# Patient Record
Sex: MALE | Race: WHITE | NOT HISPANIC OR LATINO | Employment: OTHER | URBAN - METROPOLITAN AREA
[De-identification: names, ages, dates, MRNs, and addresses within clinical notes are randomized per-mention and may not be internally consistent; named-entity substitution may affect disease eponyms.]

---

## 2018-08-16 ENCOUNTER — TELEPHONE (OUTPATIENT)
Dept: CARDIAC REHAB | Facility: CLINIC | Age: 83
End: 2018-08-16

## 2018-08-16 ENCOUNTER — HOSPITAL ENCOUNTER (OUTPATIENT)
Dept: CARDIAC REHAB | Facility: CLINIC | Age: 83
End: 2018-08-16
Attending: INTERNAL MEDICINE
Payer: MEDICARE

## 2018-08-16 NOTE — PROGRESS NOTES
"RT met with patient and his spouse. Reviewed patient history, which was sparse and incomplete due to minimal faxed records. By the end of the interview, Care Everywhere access was available (as a call had been placed to [facility] earlier for more records). The Pulmonary Rehab program was described in detail including: expectations, attendance, goals, and orientation. As RT was about to get patient's BP before doing the 6MWT, patient turned to his wife and said \"I don't want this. I'll just keep going as I've been going.\" Staff asked him his concerns and he just didn't feel this would be a good fit for him with his schedule and the distance he'd have to travel to and from. The interview lasted approximately 45 minutes, and the patient has decided against Pulmonary Rehab.  "

## 2021-04-06 ENCOUNTER — HOSPITAL LABORATORY (OUTPATIENT)
Dept: OTHER | Facility: CLINIC | Age: 86
End: 2021-04-06

## 2021-04-07 LAB
GAMMA INTERFERON BACKGROUND BLD IA-ACNC: 0.14 IU/ML
M TB IFN-G CD4+ BCKGRND COR BLD-ACNC: 4.94 IU/ML
M TB TUBERC IFN-G BLD QL: NEGATIVE
MITOGEN IGNF BCKGRD COR BLD-ACNC: 0.01 IU/ML
MITOGEN IGNF BCKGRD COR BLD-ACNC: 0.04 IU/ML

## 2021-04-09 ENCOUNTER — DOCUMENTATION ONLY (OUTPATIENT)
Dept: OTHER | Facility: CLINIC | Age: 86
End: 2021-04-09

## 2021-04-20 PROBLEM — I25.5 ISCHEMIC CARDIOMYOPATHY: Status: ACTIVE | Noted: 2017-08-31

## 2021-04-20 PROBLEM — E78.5 HYPERLIPIDEMIA: Status: ACTIVE | Noted: 2021-04-20

## 2021-04-20 PROBLEM — I50.22 CHRONIC SYSTOLIC HEART FAILURE (H): Status: ACTIVE | Noted: 2017-10-05

## 2021-04-20 PROBLEM — M15.9 GENERALIZED OSTEOARTHROSIS: Status: ACTIVE | Noted: 2021-04-20

## 2021-04-20 PROBLEM — I10 ESSENTIAL HYPERTENSION: Status: ACTIVE | Noted: 2017-10-05

## 2021-04-20 PROBLEM — I51.3 APICAL MURAL THROMBUS: Status: ACTIVE | Noted: 2017-08-31

## 2021-04-20 PROBLEM — K63.5 COLON POLYPS: Status: ACTIVE | Noted: 2018-11-28

## 2021-04-20 PROBLEM — R31.0 GROSS HEMATURIA: Status: ACTIVE | Noted: 2019-09-09

## 2021-04-20 PROBLEM — M75.00 ADHESIVE CAPSULITIS OF SHOULDER: Status: ACTIVE | Noted: 2021-04-20

## 2021-04-20 PROBLEM — I47.10 SVT (SUPRAVENTRICULAR TACHYCARDIA) (H): Status: ACTIVE | Noted: 2017-08-31

## 2021-04-20 PROBLEM — M17.11 PRIMARY OSTEOARTHRITIS OF RIGHT KNEE: Status: ACTIVE | Noted: 2020-02-28

## 2021-04-20 PROBLEM — R97.20 ELEVATED PROSTATE SPECIFIC ANTIGEN (PSA): Status: ACTIVE | Noted: 2021-04-20

## 2021-04-20 PROBLEM — I47.20 VT (VENTRICULAR TACHYCARDIA) (H): Status: ACTIVE | Noted: 2017-08-31

## 2021-04-20 PROBLEM — I25.10 CORONARY ATHEROSCLEROSIS: Status: ACTIVE | Noted: 2021-04-20

## 2021-04-20 PROBLEM — K64.8 HEMORRHOIDS, INTERNAL: Status: ACTIVE | Noted: 2018-11-28

## 2021-04-20 PROBLEM — Z78.9 ACTIVE ADVANCE DIRECTIVE: Status: ACTIVE | Noted: 2018-05-04

## 2021-04-20 PROBLEM — D64.9 ANEMIA: Status: ACTIVE | Noted: 2018-11-28

## 2021-04-20 PROBLEM — M67.919 DISORDER OF BURSAE AND TENDONS IN SHOULDER REGION: Status: ACTIVE | Noted: 2021-04-20

## 2021-04-20 PROBLEM — U07.1 2019 NOVEL CORONAVIRUS DISEASE (COVID-19): Status: ACTIVE | Noted: 2021-04-05

## 2021-04-20 PROBLEM — M71.9 DISORDER OF BURSAE AND TENDONS IN SHOULDER REGION: Status: ACTIVE | Noted: 2021-04-20

## 2021-04-20 PROBLEM — Z95.1 S/P CABG (CORONARY ARTERY BYPASS GRAFT): Status: ACTIVE | Noted: 2017-08-31

## 2021-04-20 NOTE — PROGRESS NOTES
[facility] GERIATRIC SERVICES  Primary Care Provider & Clinic: TALAT  ELVIRA, [address] [facility] [facility] / [place] [address]  Chief Complaint   Patient presents with     Hospital F/U     [facility] 4/12/2021 - 4/20/2021     [place] Medical Record Number: [id number]  Place of Service Where Encounter Took Place: THE ESTATES AT [facility] ([facility]) [[id number]]    Rubén Lane is a 88 year old (1/11/1933), admitted to the above facility from  [facility] . Hospital stay 4/12/21 through 4/20/21. Admitted to this facility for rehab, medical management and nursing care.    HPI:      H&P reviewed.   Patient tested + for COVID 4/1/21. Had sx's one week prior.   He was recovering at [facility] and was sent to ER for hypoxia. On high flow o2 for 3 days. Started on prednisone and tocilizumab. Unresponsive episode and tele showed VTACH. Cards started sololol despite prolonged QTC (525 sec). ICD was adjusted to lower shock threshold. Patient noted to have hematuria, urology consulted and ok to continue xarelto due to stable hgb.     No RN concerns today.   Met with patient in his room. He is lying in bed. Affect is flat. States his wife is also ill with COVID, but is recovering at home. He denies CP or dyspnea. No bowel or bladder concerns.     Code Status/Advanced Directives Discussion: DNR only  Patient's Living Condition: lives with spouse in a house  ALLERGIES: Patient has no known allergies.  PAST MEDICAL HISTORY:  has no past medical history on file.  PAST SURGICAL HISTORY:  has no past surgical history on file.  FAMILY HISTORY: family history is not on file.    Post Discharge Medication Reconciliation Status: discharge medications reconciled, continue medications without change  Current Outpatient Medications   Medication Sig Dispense Refill     acetaminophen (TYLENOL) 500 MG tablet Take 1,000 mg by mouth 3 times daily       albuterol (PROAIR HFA/PROVENTIL HFA/VENTOLIN HFA) 108 (90 Base) MCG/ACT inhaler Inhale 2  "puffs into the lungs 4 times daily       apixaban ANTICOAGULANT (ELIQUIS) 5 MG tablet Take 5 mg by mouth 2 times daily       aspirin (ASPIRIN ADULT LOW DOSE) 81 MG EC tablet Take 81 mg by mouth daily       atorvastatin (LIPITOR) 80 MG tablet Take 80 mg by mouth every evening       FLUoxetine (PROZAC) 20 MG capsule Take 40 mg by mouth daily       metoprolol tartrate (LOPRESSOR) 25 MG tablet Take 25 mg by mouth 2 times daily       nitroGLYcerin (NITROSTAT) 0.4 MG sublingual tablet Place 0.4 mg under the tongue every 5 minutes as needed for chest pain For chest pain place 1 tablet under the tongue every 5 minutes for 3 doses. If symptoms persist 5 minutes after 1st dose call 911.       sotalol (BETAPACE) 80 MG tablet Take 80 mg by mouth daily       ROS:  10 point ROS of systems including Constitutional, Eyes, Respiratory, Cardiovascular, Gastroenterology, Genitourinary, Integumentary, Musculoskeletal, Psychiatric were all negative except for pertinent positives noted in my HPI.    Vitals:  /68   Pulse 72   Temp 97.8  F (36.6  C)   Resp 18   Ht 1.803 m (5' 11\")   Wt 75.8 kg (167 lb 3.2 oz)   SpO2 92%   BMI 23.32 kg/m    Exam:  GENERAL APPEARANCE:  Alert, in no distress  RESP:  respiratory effort and palpation of chest normal, auscultation of lungs diminished/clear , no respiratory distress  CV:  Palpation and auscultation of heart done , rate and rhythm irreg, distant heart tones peripheral edema  ABDOMEN:  normal bowel sounds, soft, nontender, no hepatosplenomegaly or other masses  M/S:   Gait and station steady, Digits and nails no concerns  SKIN:  Inspection and Palpation of skin and subcutaneous tissue pale/dry  PSYCH:  insight and judgement, memory intact , affect flat    Lab/Diagnostic data:  Recent labs in [facility] reviewed by me today.     ASSESSMENT/PLAN:  Pneumonia due to 2019 novel coronavirus  - + date 4/1/21. Treated with prednisone and tocilizumab and high flow o2  - on 2L supplemental oxygen, no " current resp distress  - labs ordered. OK to wean o2 to keep sats >90%    Biventricular CHF (congestive heart failure) (H)  - EF 30%. ICD with pacemaker  - cards started sotolol due to episode of Vtach (despite prolonged QTC)   - patient verified DNR/DNI status      Coronary artery disease involving native coronary artery of native heart without angina pectoris  ICD (implantable cardioverter-defibrillator) in place  - hx of CABG  - on statin, Asa, xarelto and BB. ACE stopped due to hypotension    PAF (paroxysmal atrial fibrillation) (H)  - on asa and xarelto  - rate controlled on metoprolol    Benign essential microscopic hematuria  - urology consult. Ok to continue xarelto and asa  - f/u with urology in 6-8 weeks  - plan to follow labs    Current mild episode of major depressive disorder, unspecified whether recurrent (H)  - on prozac, monitor     .     Electronically signed by:  HILDA Key CNP

## 2021-04-21 ENCOUNTER — NURSING HOME VISIT (OUTPATIENT)
Dept: GERIATRICS | Facility: CLINIC | Age: 86
End: 2021-04-21
Payer: COMMERCIAL

## 2021-04-21 VITALS
WEIGHT: 167.2 LBS | DIASTOLIC BLOOD PRESSURE: 68 MMHG | OXYGEN SATURATION: 92 % | RESPIRATION RATE: 18 BRPM | HEIGHT: 71 IN | TEMPERATURE: 97.8 F | SYSTOLIC BLOOD PRESSURE: 105 MMHG | BODY MASS INDEX: 23.41 KG/M2 | HEART RATE: 72 BPM

## 2021-04-21 DIAGNOSIS — I48.0 PAF (PAROXYSMAL ATRIAL FIBRILLATION) (H): ICD-10-CM

## 2021-04-21 DIAGNOSIS — I25.10 CORONARY ARTERY DISEASE INVOLVING NATIVE CORONARY ARTERY OF NATIVE HEART WITHOUT ANGINA PECTORIS: ICD-10-CM

## 2021-04-21 DIAGNOSIS — J12.82 PNEUMONIA DUE TO 2019 NOVEL CORONAVIRUS: Primary | ICD-10-CM

## 2021-04-21 DIAGNOSIS — F32.0 CURRENT MILD EPISODE OF MAJOR DEPRESSIVE DISORDER, UNSPECIFIED WHETHER RECURRENT (H): ICD-10-CM

## 2021-04-21 DIAGNOSIS — I50.82 BIVENTRICULAR CHF (CONGESTIVE HEART FAILURE) (H): ICD-10-CM

## 2021-04-21 DIAGNOSIS — R31.1 BENIGN ESSENTIAL MICROSCOPIC HEMATURIA: ICD-10-CM

## 2021-04-21 DIAGNOSIS — U07.1 PNEUMONIA DUE TO 2019 NOVEL CORONAVIRUS: Primary | ICD-10-CM

## 2021-04-21 DIAGNOSIS — Z95.810 ICD (IMPLANTABLE CARDIOVERTER-DEFIBRILLATOR) IN PLACE: ICD-10-CM

## 2021-04-21 PROBLEM — R41.82 ALTERED MENTAL STATUS, UNSPECIFIED: Status: ACTIVE | Noted: 2021-04-05

## 2021-04-21 PROBLEM — E78.5 HYPERLIPIDEMIA, UNSPECIFIED: Status: ACTIVE | Noted: 2021-04-05

## 2021-04-21 PROBLEM — M62.81 MUSCLE WEAKNESS (GENERALIZED): Status: ACTIVE | Noted: 2021-04-05

## 2021-04-21 PROBLEM — R09.02 HYPOXEMIA: Status: ACTIVE | Noted: 2021-04-05

## 2021-04-21 PROBLEM — J12.81 PNEUMONIA DUE TO SARS-ASSOCIATED CORONAVIRUS: Status: ACTIVE | Noted: 2021-04-05

## 2021-04-21 PROBLEM — F33.9 MAJOR DEPRESSIVE DISORDER, RECURRENT, UNSPECIFIED (H): Status: ACTIVE | Noted: 2021-04-05

## 2021-04-21 PROCEDURE — 99309 SBSQ NF CARE MODERATE MDM 30: CPT | Performed by: NURSE PRACTITIONER

## 2021-04-21 RX ORDER — NITROGLYCERIN 0.4 MG/1
0.4 TABLET SUBLINGUAL EVERY 5 MIN PRN
COMMUNITY
Start: 2021-04-21

## 2021-04-21 RX ORDER — METOPROLOL TARTRATE 25 MG/1
25 TABLET, FILM COATED ORAL 2 TIMES DAILY
COMMUNITY
Start: 2021-04-21 | End: 2023-09-07

## 2021-04-21 RX ORDER — ACETAMINOPHEN 500 MG
1000 TABLET ORAL 3 TIMES DAILY
COMMUNITY
Start: 2021-04-21

## 2021-04-21 RX ORDER — ATORVASTATIN CALCIUM 80 MG/1
80 TABLET, FILM COATED ORAL EVERY EVENING
COMMUNITY
Start: 2021-04-21 | End: 2023-09-07 | Stop reason: ALTCHOICE

## 2021-04-21 RX ORDER — ALBUTEROL SULFATE 90 UG/1
2 AEROSOL, METERED RESPIRATORY (INHALATION) 4 TIMES DAILY
COMMUNITY
Start: 2021-04-21

## 2021-04-21 RX ORDER — SOTALOL HYDROCHLORIDE 80 MG/1
80 TABLET ORAL DAILY
COMMUNITY
Start: 2021-04-21

## 2021-04-21 ASSESSMENT — MIFFLIN-ST. JEOR: SCORE: 1450.54

## 2021-04-21 NOTE — LETTER
4/21/2021        RE: Rubén Lane  [address]  [place] [address]        [facility] GERIATRIC SERVICES  Primary Care Provider & Clinic: TALAT FELIX, [address] [facility] / [place] [address]  Chief Complaint   Patient presents with     Hospital F/U     [facility] 4/12/2021 - 4/20/2021     [place] Medical Record Number: [id number]  Place of Service Where Encounter Took Place: THE ESTATES AT [facility] ([facility]) [[id number]]    Rubén Lane is a 88 year old (1/11/1933), admitted to the above facility from  [facility] . Hospital stay 4/12/21 through 4/20/21. Admitted to this facility for rehab, medical management and nursing care.    HPI:      H&P reviewed.   Patient tested + for COVID 4/1/21. Had sx's one week prior.   He was recovering at [facility] and was sent to ER for hypoxia. On high flow o2 for 3 days. Started on prednisone and tocilizumab. Unresponsive episode and tele showed VTACH. Cards started sololol despite prolonged QTC (525 sec). ICD was adjusted to lower shock threshold. Patient noted to have hematuria, urology consulted and ok to continue xarelto due to stable hgb.     No RN concerns today.   Met with patient in his room. He is lying in bed. Affect is flat. States his wife is also ill with COVID, but is recovering at home. He denies CP or dyspnea. No bowel or bladder concerns.     Code Status/Advanced Directives Discussion: DNR only  Patient's Living Condition: lives with spouse in a house  ALLERGIES: Patient has no known allergies.  PAST MEDICAL HISTORY:  has no past medical history on file.  PAST SURGICAL HISTORY:  has no past surgical history on file.  FAMILY HISTORY: family history is not on file.    Post Discharge Medication Reconciliation Status: discharge medications reconciled, continue medications without change  Current Outpatient Medications   Medication Sig Dispense Refill     acetaminophen (TYLENOL) 500 MG tablet Take 1,000 mg by mouth 3 times daily        "albuterol (PROAIR HFA/PROVENTIL HFA/VENTOLIN HFA) 108 (90 Base) MCG/ACT inhaler Inhale 2 puffs into the lungs 4 times daily       apixaban ANTICOAGULANT (ELIQUIS) 5 MG tablet Take 5 mg by mouth 2 times daily       aspirin (ASPIRIN ADULT LOW DOSE) 81 MG EC tablet Take 81 mg by mouth daily       atorvastatin (LIPITOR) 80 MG tablet Take 80 mg by mouth every evening       FLUoxetine (PROZAC) 20 MG capsule Take 40 mg by mouth daily       metoprolol tartrate (LOPRESSOR) 25 MG tablet Take 25 mg by mouth 2 times daily       nitroGLYcerin (NITROSTAT) 0.4 MG sublingual tablet Place 0.4 mg under the tongue every 5 minutes as needed for chest pain For chest pain place 1 tablet under the tongue every 5 minutes for 3 doses. If symptoms persist 5 minutes after 1st dose call 911.       sotalol (BETAPACE) 80 MG tablet Take 80 mg by mouth daily       ROS:  10 point ROS of systems including Constitutional, Eyes, Respiratory, Cardiovascular, Gastroenterology, Genitourinary, Integumentary, Musculoskeletal, Psychiatric were all negative except for pertinent positives noted in my HPI.    Vitals:  /68   Pulse 72   Temp 97.8  F (36.6  C)   Resp 18   Ht 1.803 m (5' 11\")   Wt 75.8 kg (167 lb 3.2 oz)   SpO2 92%   BMI 23.32 kg/m    Exam:  GENERAL APPEARANCE:  Alert, in no distress  RESP:  respiratory effort and palpation of chest normal, auscultation of lungs diminished/clear , no respiratory distress  CV:  Palpation and auscultation of heart done , rate and rhythm irreg, distant heart tones peripheral edema  ABDOMEN:  normal bowel sounds, soft, nontender, no hepatosplenomegaly or other masses  M/S:   Gait and station steady, Digits and nails no concerns  SKIN:  Inspection and Palpation of skin and subcutaneous tissue pale/dry  PSYCH:  insight and judgement, memory intact , affect flat    Lab/Diagnostic data:  Recent labs in [facility] reviewed by me today.     ASSESSMENT/PLAN:  Pneumonia due to 2019 novel coronavirus  - + date 4/1/21. " Treated with prednisone and tocilizumab and high flow o2  - on 2L supplemental oxygen, no current resp distress  - labs ordered. OK to wean o2 to keep sats >90%    Biventricular CHF (congestive heart failure) (H)  - EF 30%. ICD with pacemaker  - cards started sotolol due to episode of Vtach (despite prolonged QTC)   - patient verified DNR/DNI status      Coronary artery disease involving native coronary artery of native heart without angina pectoris  ICD (implantable cardioverter-defibrillator) in place  - hx of CABG  - on statin, Asa, xarelto and BB. ACE stopped due to hypotension    PAF (paroxysmal atrial fibrillation) (H)  - on asa and xarelto  - rate controlled on metoprolol    Benign essential microscopic hematuria  - urology consult. Ok to continue xarelto and asa  - f/u with urology in 6-8 weeks  - plan to follow labs    Current mild episode of major depressive disorder, unspecified whether recurrent (H)  - on prozac, monitor     .     Electronically signed by:  HILDA Key CNP           Sincerely,        HILDA Key CNP

## 2021-04-26 ENCOUNTER — NURSING HOME VISIT (OUTPATIENT)
Dept: GERIATRICS | Facility: CLINIC | Age: 86
End: 2021-04-26
Payer: COMMERCIAL

## 2021-04-26 VITALS
BODY MASS INDEX: 27.61 KG/M2 | RESPIRATION RATE: 18 BRPM | DIASTOLIC BLOOD PRESSURE: 67 MMHG | WEIGHT: 197.2 LBS | SYSTOLIC BLOOD PRESSURE: 103 MMHG | HEART RATE: 71 BPM | OXYGEN SATURATION: 96 % | HEIGHT: 71 IN | TEMPERATURE: 97.4 F

## 2021-04-26 DIAGNOSIS — U07.1 PNEUMONIA DUE TO 2019 NOVEL CORONAVIRUS: Primary | ICD-10-CM

## 2021-04-26 DIAGNOSIS — J12.82 PNEUMONIA DUE TO 2019 NOVEL CORONAVIRUS: Primary | ICD-10-CM

## 2021-04-26 DIAGNOSIS — F32.0 CURRENT MILD EPISODE OF MAJOR DEPRESSIVE DISORDER, UNSPECIFIED WHETHER RECURRENT (H): ICD-10-CM

## 2021-04-26 PROCEDURE — 99309 SBSQ NF CARE MODERATE MDM 30: CPT | Performed by: NURSE PRACTITIONER

## 2021-04-26 ASSESSMENT — MIFFLIN-ST. JEOR: SCORE: 1586.62

## 2021-04-26 NOTE — LETTER
"    4/26/2021        RE: Rubén Lane  [address] [address]        [facility] GERIATRIC SERVICES    Chief Complaint   Patient presents with     RECHECK     HPI:    Rubén Lane is a 88 year old  (1/11/1933), who is being seen today for an episodic care visit at: [facility] ([facility]) [[id number]]    Seeing patient for a f/u.   Staff note patient with depression and crying spells last week.   Patient requesting to go home this week. Patient is clear to return home by therapy.   Met with patient and his wife in his room.   He is feeling well, no breathing concerns, no CP.       PMH/PSH reviewed in [facility] today.  REVIEW OF SYSTEMS:  4 point ROS including Respiratory, CV, GI and , other than that noted in the HPI,  is negative    EXAM:  /67   Pulse 71   Temp 97.4  F (36.3  C)   Resp 18   Ht 1.803 m (5' 11\")   Wt 89.4 kg (197 lb 3.2 oz)   SpO2 96%   BMI 27.50 kg/m    GEN: alert, no distress  RESP: lungs clear, using supplemental oxygen  SKIN: pale/dry  ABD: soft, non tender    Labs done in SNF are in [facility]. Please refer to them using [facility]/Care Everywhere.    Assessment/Plan:     Pneumonia due to 2019 novel coronavirus  Current mild episode of major depressive disorder, unspecified whether recurrent (H)   - Patient planning to return home in 2 days with home care  Will attempt to wean patient off oxygen.     Orders:    Wean oxygen to keep sats >90%    Electronically signed by:  HILDA Key CNP          Sincerely,        HILDA Key CNP    "

## 2021-04-26 NOTE — PROGRESS NOTES
"[facility] GERIATRIC SERVICES    Chief Complaint   Patient presents with     RECHECK     HPI:    Rubén Lane is a 88 year old  (1/11/1933), who is being seen today for an episodic care visit at: [facility] ([facility]) [[id number]]    Seeing patient for a f/u.   Staff note patient with depression and crying spells last week.   Patient requesting to go home this week. Patient is clear to return home by therapy.   Met with patient and his wife in his room.   He is feeling well, no breathing concerns, no CP.       PMH/PSH reviewed in EPIC today.  REVIEW OF SYSTEMS:  4 point ROS including Respiratory, CV, GI and , other than that noted in the HPI,  is negative    EXAM:  /67   Pulse 71   Temp 97.4  F (36.3  C)   Resp 18   Ht 1.803 m (5' 11\")   Wt 89.4 kg (197 lb 3.2 oz)   SpO2 96%   BMI 27.50 kg/m    GEN: alert, no distress  RESP: lungs clear, using supplemental oxygen  SKIN: pale/dry  ABD: soft, non tender    Labs done in SNF are in [facility]. Please refer to them using [facility]/Care Everywhere.    Assessment/Plan:     Pneumonia due to 2019 novel coronavirus  Current mild episode of major depressive disorder, unspecified whether recurrent (H)   - Patient planning to return home in 2 days with home care  Will attempt to wean patient off oxygen.     Orders:    Wean oxygen to keep sats >90%    Electronically signed by:  HILDA Key CNP    "

## 2021-04-27 NOTE — PROGRESS NOTES
[facility] GERIATRIC SERVICES DISCHARGE SUMMARY  Patient Name: Rubén Lane  YOB: 1933  [place] Medical Record Number: [id number]  Place of Service Where Encounter Took Place: [facility] AT [facility] ([facility]) [[id number]]    Primary Care Provider & Clinic Responsible After Transfer: TALAT FELIX, [address] [facility] / [place] [address]; Non-[facility] Provider     Transferring providers: HILDA Cordova CNP; Mary Garcia MD  Recent Hospitalization/ED: Hospital  [facility]  stay 4/12/21 to 4/20/21.  Date of SNF Admission: April 20, 2021  Date of SNF (anticipated) Discharge: April 28, 2021  Discharged to: previous independent home  Cognitive Scores: SLUMS: 17/30 and ACL: 5.0/5.8  Physical Function: Ambulating 150 ft with rolling walker  DME: Home Oxygen    Code Status/Advanced Directives Discussion: DNR  Allergies: Patient has no known allergies.    DISCHARGE DIAGNOSIS/NURSING FACILITY COURSE:     Pneumonia due to 2019 novel coronavirus  - + date 4/1/21. Treated with prednisone and tocilizumab and high flow o2  - on 2L supplemental oxygen, no current resp distress  - attempted to discontinue oxygen, sats' down to 84%. Will order oxygen for home use. Recommend patient purchase an oximeter to monitor sats. RN educated on oximeter use     Biventricular CHF (congestive heart failure) (H)  - EF 30%. ICD with pacemaker  - cards started sotolol due to episode of Vtach (despite prolonged QTC)   - patient verified DNR/DNI status        Coronary artery disease involving native coronary artery of native heart without angina pectoris  ICD (implantable cardioverter-defibrillator) in place  - hx of CABG  - on statin, Asa, xarelto and BB. ACE stopped due to hypotension  - vitals stable at U     PAF (paroxysmal atrial fibrillation) (H)  - on asa and xarelto  - rate controlled on metoprolol     Benign essential microscopic hematuria  - urology consult during hospitalization. Ok to continue  "xarelto and asa  - f/u with urology in 6-8 weeks     Current mild episode of major depressive disorder, unspecified whether recurrent (H)  - on prozac, mood down at TCU, better now that discharging    Past Medical History:  has no past medical history on file.    Discharge Medications:  Current Outpatient Medications   Medication Sig Dispense Refill     acetaminophen (TYLENOL) 500 MG tablet Take 1,000 mg by mouth 3 times daily       albuterol (PROAIR HFA/PROVENTIL HFA/VENTOLIN HFA) 108 (90 Base) MCG/ACT inhaler Inhale 2 puffs into the lungs 4 times daily       apixaban ANTICOAGULANT (ELIQUIS) 5 MG tablet Take 5 mg by mouth 2 times daily       aspirin (ASPIRIN ADULT LOW DOSE) 81 MG EC tablet Take 81 mg by mouth daily       atorvastatin (LIPITOR) 80 MG tablet Take 80 mg by mouth every evening       FLUoxetine (PROZAC) 20 MG capsule Take 40 mg by mouth daily       metoprolol tartrate (LOPRESSOR) 25 MG tablet Take 25 mg by mouth 2 times daily       nitroGLYcerin (NITROSTAT) 0.4 MG sublingual tablet Place 0.4 mg under the tongue every 5 minutes as needed for chest pain For chest pain place 1 tablet under the tongue every 5 minutes for 3 doses. If symptoms persist 5 minutes after 1st dose call 911.       sotalol (BETAPACE) 80 MG tablet Take 80 mg by mouth daily       Medication Changes/Rationale:     none    Controlled medications sent with patient:   not applicable/none     ROS:   4 point ROS including Respiratory, CV, GI and , other than that noted in the HPI,  is negative    Physical Exam:   Vitals: /78   Pulse 69   Temp 97.1  F (36.2  C)   Resp 16   Ht 1.803 m (5' 11\")   Wt 75.8 kg (167 lb 3.2 oz)   SpO2 95%   BMI 23.32 kg/m    BMI= Body mass index is 23.32 kg/m .  GENERAL APPEARANCE:  Alert, in no distress  RESP:  lungs clear to auscultation , no respiratory distress  CV:  HR irreg, systolic murmur, no edema  ABDOMEN:  normal bowel sounds, soft, nontender, no hepatosplenomegaly or other masses  M/S:   " Gait and station normal  PSYCH:  oriented X 3, normal insight, judgement and memory     SNF labs: Labs done in SNF are in [employer] EPIC. Please refer to them using EPIC/Care Everywhere. and no labs drawn at TCU      DISCHARGE PLAN:    Follow up labs: No labs orders/due    Medical Follow Up:     Follow up/re-establish care with primary care provider within 1 week of discharge    Follow up with specialist (urologist) on 6/17/21 at 1:45 pm    Address: [address], [place], [place]     Phone number: [phone]    [facility] referral needed and placed by this provider: No  Current [employer] scheduled appointments: None      Discharge Services: Home Care: Occupational Therapy, Physical Therapy, and Registered Nurse[facility]    Discharge Instructions Verbalized to Patient at Discharge:     None    TOTAL DISCHARGE TIME:   Greater than 30 minutes  Electronically signed by:  HILDA Key CNP     I certify that this patient, Rubén Lane has been under my care (or a nurse practitioner or physican's assistant working with me). This is the face-to-face encounter for oxygen medical necessity.      Rubén Lane is now in a chronic stable state and continues to require supplemental oxygen. Patient has continued oxygen desaturation due to hypoxia secondary to viral pna due to COVID.    Alternative treatment(s) tried or considered and deemed clinically infective for treatment of pneumonia secondary to covid include pulmonary toileting.  If portability is ordered, is the patient mobile within the home? yes    **Patients who qualify for home O2 coverage under the CMS guidelines require ABG tests or O2 sat readings obtained closest to, but no earlier than 2 days prior to the discharge, as evidence of the need for home oxygen therapy. Testing must be performed while patient is in the chronic stable state. O2 sats 84% with walking.     Documentation of Face to Face and Certification for Home Health Services    I certify that  patient: Rubén Lane is under my care and that I, or a nurse practitioner or physician's assistant working with me, had a face-to-face encounter that meets the physician face-to-face encounter requirements with this patient on: 4/28/2021.    This encounter with the patient was in whole, or in part, for the following medical condition, which is the primary reason for home health care: hypoxia secondary to viral pneumonia due to COVID.    I certify that, based on my findings, the following services are medically necessary home health services: Nursing, Occupational Therapy and Physical Therapy.    My clinical findings support the need for the above services because: Nurse is needed: monitor oxygen due to hypoxia and new use of home oxygen.    Further, I certify that my clinical findings support that this patient is homebound (i.e. absences from home require considerable and taxing effort and are for medical reasons or Orthodoxy services or infrequently or of short duration when for other reasons) because: Leaving home is medically contraindicated for the following reason(s): Dyspnea on exertion that makes it so they cannot leave their home for needed services without clinical deterioration...    Based on the above findings. I certify that this patient is confined to the home and needs intermittent skilled nursing care, physical therapy and/or speech therapy.  The patient is under my care, and I have initiated the establishment of the plan of care.  This patient will be followed by a physician who will periodically review the plan of care.  Physician/Provider to provide follow up care: Tata Maguire    Attending hospital physician (the Medicare certified [facility] provider): HILDA Key CNP  Physician Signature: See electronic signature associated with these discharge orders.  Date: 4/28/2021

## 2021-04-28 ENCOUNTER — DISCHARGE SUMMARY NURSING HOME (OUTPATIENT)
Dept: GERIATRICS | Facility: CLINIC | Age: 86
End: 2021-04-28
Payer: COMMERCIAL

## 2021-04-28 VITALS
BODY MASS INDEX: 23.41 KG/M2 | HEIGHT: 71 IN | OXYGEN SATURATION: 95 % | TEMPERATURE: 97.1 F | SYSTOLIC BLOOD PRESSURE: 131 MMHG | HEART RATE: 69 BPM | DIASTOLIC BLOOD PRESSURE: 78 MMHG | RESPIRATION RATE: 16 BRPM | WEIGHT: 167.2 LBS

## 2021-04-28 DIAGNOSIS — I25.10 CORONARY ARTERY DISEASE INVOLVING NATIVE CORONARY ARTERY OF NATIVE HEART WITHOUT ANGINA PECTORIS: ICD-10-CM

## 2021-04-28 DIAGNOSIS — Z95.810 ICD (IMPLANTABLE CARDIOVERTER-DEFIBRILLATOR) IN PLACE: ICD-10-CM

## 2021-04-28 DIAGNOSIS — J12.82 PNEUMONIA DUE TO 2019 NOVEL CORONAVIRUS: Primary | ICD-10-CM

## 2021-04-28 DIAGNOSIS — I48.0 PAF (PAROXYSMAL ATRIAL FIBRILLATION) (H): ICD-10-CM

## 2021-04-28 DIAGNOSIS — I50.82 BIVENTRICULAR CHF (CONGESTIVE HEART FAILURE) (H): ICD-10-CM

## 2021-04-28 DIAGNOSIS — U07.1 PNEUMONIA DUE TO 2019 NOVEL CORONAVIRUS: Primary | ICD-10-CM

## 2021-04-28 DIAGNOSIS — R09.02 HYPOXIA: ICD-10-CM

## 2021-04-28 DIAGNOSIS — F32.0 CURRENT MILD EPISODE OF MAJOR DEPRESSIVE DISORDER, UNSPECIFIED WHETHER RECURRENT (H): ICD-10-CM

## 2021-04-28 PROCEDURE — 99316 NF DSCHRG MGMT 30 MIN+: CPT | Performed by: NURSE PRACTITIONER

## 2021-04-28 ASSESSMENT — MIFFLIN-ST. JEOR: SCORE: 1450.54

## 2021-04-28 NOTE — LETTER
4/28/2021        RE: Rubén Lane  [address] [facility] [place] [address]        [facility] GERIATRIC SERVICES DISCHARGE SUMMARY  Patient Name: Rubén Lane  YOB: 1933  [place] Medical Record Number: [id number]  [facility] [[id number]]    Primary Care Provider & Clinic Responsible After Transfer: TALAT FELIX, [address] / [place] [address]; Non-G Provider     Transferring providers: HILDA Cordova CNP; Mary Garcia MD  Recent Hospitalization/ED: Hospital  [facility]  stay 4/12/21 to 4/20/21.  Date of SNF Admission: April 20, 2021  Date of SNF (anticipated) Discharge: April 28, 2021  Discharged to: previous independent home  Cognitive Scores: SLUMS: 17/30 and ACL: 5.0/5.8  Physical Function: Ambulating 150 ft with rolling walker  DME: Home Oxygen    Code Status/Advanced Directives Discussion: DNR  Allergies: Patient has no known allergies.    DISCHARGE DIAGNOSIS/NURSING FACILITY COURSE:     Pneumonia due to 2019 novel coronavirus  - + date 4/1/21. Treated with prednisone and tocilizumab and high flow o2  - on 2L supplemental oxygen, no current resp distress  - attempted to discontinue oxygen, sats' down to 84%. Will order oxygen for home use. Recommend patient purchase an oximeter to monitor sats. RN educated on oximeter use     Biventricular CHF (congestive heart failure) (H)  - EF 30%. ICD with pacemaker  - cards started sotolol due to episode of Vtach (despite prolonged QTC)   - patient verified DNR/DNI status        Coronary artery disease involving native coronary artery of native heart without angina pectoris  ICD (implantable cardioverter-defibrillator) in place  - hx of CABG  - on statin, Asa, xarelto and BB. ACE stopped due to hypotension  - vitals stable at [facility]     PAF (paroxysmal atrial fibrillation) (H)  - on asa and xarelto  - rate controlled on metoprolol     Benign essential  "microscopic hematuria  - urology consult during hospitalization. Ok to continue xarelto and asa  - f/u with urology in 6-8 weeks     Current mild episode of major depressive disorder, unspecified whether recurrent (H)  - on prozac, mood down at TCU, better now that discharging    Past Medical History:  has no past medical history on file.    Discharge Medications:  Current Outpatient Medications   Medication Sig Dispense Refill     acetaminophen (TYLENOL) 500 MG tablet Take 1,000 mg by mouth 3 times daily       albuterol (PROAIR HFA/PROVENTIL HFA/VENTOLIN HFA) 108 (90 Base) MCG/ACT inhaler Inhale 2 puffs into the lungs 4 times daily       apixaban ANTICOAGULANT (ELIQUIS) 5 MG tablet Take 5 mg by mouth 2 times daily       aspirin (ASPIRIN ADULT LOW DOSE) 81 MG EC tablet Take 81 mg by mouth daily       atorvastatin (LIPITOR) 80 MG tablet Take 80 mg by mouth every evening       FLUoxetine (PROZAC) 20 MG capsule Take 40 mg by mouth daily       metoprolol tartrate (LOPRESSOR) 25 MG tablet Take 25 mg by mouth 2 times daily       nitroGLYcerin (NITROSTAT) 0.4 MG sublingual tablet Place 0.4 mg under the tongue every 5 minutes as needed for chest pain For chest pain place 1 tablet under the tongue every 5 minutes for 3 doses. If symptoms persist 5 minutes after 1st dose call 911.       sotalol (BETAPACE) 80 MG tablet Take 80 mg by mouth daily       Medication Changes/Rationale:     none    Controlled medications sent with patient:   not applicable/none     ROS:   4 point ROS including Respiratory, CV, GI and , other than that noted in the HPI,  is negative    Physical Exam:   Vitals: /78   Pulse 69   Temp 97.1  F (36.2  C)   Resp 16   Ht 1.803 m (5' 11\")   Wt 75.8 kg (167 lb 3.2 oz)   SpO2 95%   BMI 23.32 kg/m    BMI= Body mass index is 23.32 kg/m .  GENERAL APPEARANCE:  Alert, in no distress  RESP:  lungs clear to auscultation , no respiratory distress  CV:  HR irreg, systolic murmur, no edema  ABDOMEN:  " normal bowel sounds, soft, nontender, no hepatosplenomegaly or other masses  M/S:   Gait and station normal  PSYCH:  oriented X 3, normal insight, judgement and memory     SNF labs: Labs done in SNF are in [facility] EPIC. Please refer to them using EPIC/Care Everywhere. and no labs drawn at TCU      DISCHARGE PLAN:    Follow up labs: No labs orders/due    Medical Follow Up:     Follow up/re-establish care with primary care provider within 1 week of discharge    Follow up with specialist (urologist) on 6/17/21 at 1:45 pm    Address: [facility] [place], [place]     Phone number: [phone]    [facility] referral needed and placed by this provider: No  Current [facility] scheduled appointments: None      Discharge Services: Home Care: Occupational Therapy, Physical Therapy, and Registered Nurse. [facility]    Discharge Instructions Verbalized to Patient at Discharge:     None    TOTAL DISCHARGE TIME:   Greater than 30 minutes  Electronically signed by:  HILDA Key CNP     I certify that this patient, Rubén Lane has been under my care (or a nurse practitioner or physican's assistant working with me). This is the face-to-face encounter for oxygen medical necessity.      Rubén Lane is now in a chronic stable state and continues to require supplemental oxygen. Patient has continued oxygen desaturation due to hypoxia secondary to viral pna due to COVID.    Alternative treatment(s) tried or considered and deemed clinically infective for treatment of pneumonia secondary to covid include pulmonary toileting.  If portability is ordered, is the patient mobile within the home? yes    **Patients who qualify for home O2 coverage under the CMS guidelines require ABG tests or O2 sat readings obtained closest to, but no earlier than 2 days prior to the discharge, as evidence of the need for home oxygen therapy. Testing must be performed while patient is in the chronic stable state. O2 sats 84% with walking.      Documentation of Face to Face and Certification for Home Health Services    I certify that patient: Rubén Lane is under my care and that I, or a nurse practitioner or physician's assistant working with me, had a face-to-face encounter that meets the physician face-to-face encounter requirements with this patient on: 4/28/2021.    This encounter with the patient was in whole, or in part, for the following medical condition, which is the primary reason for home health care: hypoxia secondary to viral pneumonia due to COVID.    I certify that, based on my findings, the following services are medically necessary home health services: Nursing, Occupational Therapy and Physical Therapy.    My clinical findings support the need for the above services because: Nurse is needed: monitor oxygen due to hypoxia and new use of home oxygen.    Further, I certify that my clinical findings support that this patient is homebound (i.e. absences from home require considerable and taxing effort and are for medical reasons or Christianity services or infrequently or of short duration when for other reasons) because: Leaving home is medically contraindicated for the following reason(s): Dyspnea on exertion that makes it so they cannot leave their home for needed services without clinical deterioration...    Based on the above findings. I certify that this patient is confined to the home and needs intermittent skilled nursing care, physical therapy and/or speech therapy.  The patient is under my care, and I have initiated the establishment of the plan of care.  This patient will be followed by a physician who will periodically review the plan of care.  Physician/Provider to provide follow up care: Tata Maguire    Attending hospital physician (the Medicare certified [facility] provider): HILDA Key CNP  Physician Signature: See electronic signature associated with these discharge orders.  Date:  4/28/2021        Sincerely,        HILDA Key CNP

## 2021-05-12 ENCOUNTER — OFFICE VISIT (OUTPATIENT)
Dept: FAMILY MEDICINE | Facility: CLINIC | Age: 86
End: 2021-05-12
Payer: COMMERCIAL

## 2021-05-12 VITALS — OXYGEN SATURATION: 94 % | DIASTOLIC BLOOD PRESSURE: 64 MMHG | SYSTOLIC BLOOD PRESSURE: 102 MMHG | HEART RATE: 68 BPM

## 2021-05-12 DIAGNOSIS — I25.10 ATHEROSCLEROSIS OF NATIVE CORONARY ARTERY OF NATIVE HEART WITHOUT ANGINA PECTORIS: ICD-10-CM

## 2021-05-12 DIAGNOSIS — Z00.00 ENCOUNTER FOR MEDICARE ANNUAL WELLNESS EXAM: Primary | ICD-10-CM

## 2021-05-12 DIAGNOSIS — I25.5 ISCHEMIC CARDIOMYOPATHY: ICD-10-CM

## 2021-05-12 DIAGNOSIS — M17.11 PRIMARY OSTEOARTHRITIS OF RIGHT KNEE: ICD-10-CM

## 2021-05-12 DIAGNOSIS — I50.22 CHRONIC SYSTOLIC HEART FAILURE (H): ICD-10-CM

## 2021-05-12 DIAGNOSIS — I48.0 PAROXYSMAL ATRIAL FIBRILLATION (H): ICD-10-CM

## 2021-05-12 DIAGNOSIS — Z79.01 ANTICOAGULATED: ICD-10-CM

## 2021-05-12 DIAGNOSIS — U07.1 COVID-19: ICD-10-CM

## 2021-05-12 PROBLEM — R09.02 HYPOXEMIA: Status: RESOLVED | Noted: 2021-04-05 | Resolved: 2021-05-12

## 2021-05-12 PROBLEM — M75.00 ADHESIVE CAPSULITIS OF SHOULDER: Status: RESOLVED | Noted: 2021-04-20 | Resolved: 2021-05-12

## 2021-05-12 PROBLEM — R31.0 GROSS HEMATURIA: Status: RESOLVED | Noted: 2019-09-09 | Resolved: 2021-05-12

## 2021-05-12 PROBLEM — Z95.1 S/P CABG (CORONARY ARTERY BYPASS GRAFT): Status: RESOLVED | Noted: 2017-08-31 | Resolved: 2021-05-12

## 2021-05-12 PROBLEM — K63.5 COLON POLYPS: Status: RESOLVED | Noted: 2018-11-28 | Resolved: 2021-05-12

## 2021-05-12 PROBLEM — Z78.9 ACTIVE ADVANCE DIRECTIVE: Status: RESOLVED | Noted: 2018-05-04 | Resolved: 2021-05-12

## 2021-05-12 PROBLEM — D64.9 ANEMIA, UNSPECIFIED: Status: RESOLVED | Noted: 2018-11-28 | Resolved: 2021-05-12

## 2021-05-12 PROBLEM — I51.3 APICAL MURAL THROMBUS: Status: RESOLVED | Noted: 2017-08-31 | Resolved: 2021-05-12

## 2021-05-12 PROBLEM — M71.9 DISORDER OF BURSAE AND TENDONS IN SHOULDER REGION: Status: RESOLVED | Noted: 2021-04-20 | Resolved: 2021-05-12

## 2021-05-12 PROBLEM — M67.919 DISORDER OF BURSAE AND TENDONS IN SHOULDER REGION: Status: RESOLVED | Noted: 2021-04-20 | Resolved: 2021-05-12

## 2021-05-12 PROBLEM — I47.10 SVT (SUPRAVENTRICULAR TACHYCARDIA) (H): Status: RESOLVED | Noted: 2017-08-31 | Resolved: 2021-05-12

## 2021-05-12 PROBLEM — M62.81 MUSCLE WEAKNESS (GENERALIZED): Status: RESOLVED | Noted: 2021-04-05 | Resolved: 2021-05-12

## 2021-05-12 PROBLEM — J12.81 PNEUMONIA DUE TO SARS-ASSOCIATED CORONAVIRUS: Status: RESOLVED | Noted: 2021-04-05 | Resolved: 2021-05-12

## 2021-05-12 PROBLEM — I47.20 VT (VENTRICULAR TACHYCARDIA) (H): Status: RESOLVED | Noted: 2017-08-31 | Resolved: 2021-05-12

## 2021-05-12 PROBLEM — F33.9 MAJOR DEPRESSIVE DISORDER, RECURRENT, UNSPECIFIED (H): Status: RESOLVED | Noted: 2021-04-05 | Resolved: 2021-05-12

## 2021-05-12 PROBLEM — R41.82 ALTERED MENTAL STATUS, UNSPECIFIED: Status: RESOLVED | Noted: 2021-04-05 | Resolved: 2021-05-12

## 2021-05-12 PROCEDURE — 99397 PER PM REEVAL EST PAT 65+ YR: CPT | Performed by: FAMILY MEDICINE

## 2021-05-12 PROCEDURE — 99214 OFFICE O/P EST MOD 30 MIN: CPT | Mod: 25 | Performed by: FAMILY MEDICINE

## 2021-05-12 ASSESSMENT — ANXIETY QUESTIONNAIRES
GAD7 TOTAL SCORE: 3
5. BEING SO RESTLESS THAT IT IS HARD TO SIT STILL: NOT AT ALL
7. FEELING AFRAID AS IF SOMETHING AWFUL MIGHT HAPPEN: NOT AT ALL
2. NOT BEING ABLE TO STOP OR CONTROL WORRYING: SEVERAL DAYS
1. FEELING NERVOUS, ANXIOUS, OR ON EDGE: NOT AT ALL
6. BECOMING EASILY ANNOYED OR IRRITABLE: SEVERAL DAYS
3. WORRYING TOO MUCH ABOUT DIFFERENT THINGS: SEVERAL DAYS

## 2021-05-12 ASSESSMENT — PATIENT HEALTH QUESTIONNAIRE - PHQ9
SUM OF ALL RESPONSES TO PHQ QUESTIONS 1-9: 4
5. POOR APPETITE OR OVEREATING: NOT AT ALL

## 2021-05-12 NOTE — PROGRESS NOTES
"SUBJECTIVE:   Rubén Lane is a 88 year old male who presents for Preventive Visit.    {  Patient has been advised of split billing requirements and indicates understanding: Yes  Are you in the first 12 months of your Medicare Part B coverage?  No    Physical Health:    In general, how would you rate your overall physical health? good    Outside of work, how many days during the week do you exercise? 4-5 days/week    Outside of work, approximately how many minutes a day do you exercise?30-45 minutes    If you drink alcohol do you typically have >3 drinks per day or >7 drinks per week? No    Do you usually eat at least 4 servings of fruit and vegetables a day, include whole grains & fiber and avoid regularly eating high fat or \"junk\" foods? no    Do you have any problems taking medications regularly?  No    Do you have any side effects from medications? not applicable    Needs assistance for the following daily activities: no assistance needed    Which of the following safety concerns are present in your home?  none identified     Hearing impairment: No    In the past 6 months, have you been bothered by leaking of urine? no    Mental Health:    In general, how would you rate your overall mental or emotional health? fair  PHQ-2 Score:      Do you feel safe in your environment? Yes    Have you ever done Advance Care Planning? (For example, a Health Directive, POLST, or a discussion with a medical provider or your loved ones about your wishes): Yes, advance care planning is on file.    Additional concerns to address?  No    Fall risk:  Fallen 2 or more times in the past year?: No  Any fall with injury in the past year?: No        Do you have sleep apnea, excessive snoring or daytime drowsiness?: no        Has homecare that comes to his house for pt   Has oxygen since his discharge [facility]12     Covid: now with oxygen albuterol too.  Never before.  No copd hx.  Improving slowly, tapering off oxygen " "with home care  Systolic CHF: 30% EF, mod valve regurg.  Follows with cards  Ischemic cardiomyopathy: stable, follows with cards.  Updated problem list.  No LE edema  Anticoagulation: see above   Afib: anticoagulation. Sotalol. Paroxysmal  Cad: hx bypass.  See problem list.  Stable      Lives at home with wife.        Reviewed and updated as needed this visit by clinical staff   Allergies  Meds              Reviewed and updated as needed this visit by Provider                Social History     Tobacco Use     Smoking status: Not on file   Substance Use Topics     Alcohol use: Not on file                           Current providers sharing in care for this patient include:     The following health maintenance items are reviewed in Epic and correct as of today:  Health Maintenance   Topic Date Due     ALT  Never done     BMP  Never done     HF ACTION PLAN  Never done     LIPID  Never done     TSH W/FREE T4 REFLEX  Never done     ANNUAL REVIEW OF HM ORDERS  Never done     DEPRESSION ACTION PLAN  Never done     PHQ-9  Never done     CBC  Never done     COVID-19 Vaccine (1) Never done     MEDICARE ANNUAL WELLNESS VISIT  Never done     FALL RISK ASSESSMENT  Never done     DTAP/TDAP/TD IMMUNIZATION (3 - Td) 03/03/2024     ADVANCE CARE PLANNING  04/09/2026     INFLUENZA VACCINE  Completed     Pneumococcal Vaccine: Pediatrics (0 to 5 Years) and At-Risk Patients (6 to 64 Years)  Completed     Pneumococcal Vaccine: 65+ Years  Completed     ZOSTER IMMUNIZATION  Completed     IPV IMMUNIZATION  Aged Out     MENINGITIS IMMUNIZATION  Aged Out     HEPATITIS B IMMUNIZATION  Aged Out       Ros: improving.  Sob improving.  No cp.  nofever or cough.  No urine/stool changes    OBJECTIVE:   /64   Pulse 68   SpO2 94%  Estimated body mass index is 23.32 kg/m  as calculated from the following:    Height as of 4/28/21: 1.803 m (5' 11\").    Weight as of 4/28/21: 75.8 kg (167 lb 3.2 oz).  EXAM:   Gen: alert and oriented, in no acute " "distress, affect within normal limits  Neck: supple with no masses or nodes  Throat: oropharynx clear, no exudate or tonsillar/palate asymmetry.    CV: RRR, no murmur  Lungs: clear bilaterally with good effort  Abd: nontender, no mass  Ext: no edema or lesions   Neuro: moving all extremities, gait normal, no focal deficts noted      ASSESSMENT / PLAN:   Wellness visit  Covid: .  Improving slowly, tapering off oxygen with home care  Systolic CHF: 30% EF, mod valve regurg.    Ischemic cardiomyopathy: stable, follows with cards.   Anticoagulation: see above   Afib: anticoagulation. Sotalol. Paroxysmal.  Stable  CAD: stable     Reviewed meds.  Stop prozac, doesn't feel mood/anxiety has been an issue, wife agrees.  Not sure why he was still on it.   Restart if needed    Reviewed previous records, meds.  Cleaned up problem list.  They will be coming here now.          COUNSELING:  Reviewed preventive health counseling, as reflected in patient instructions       Regular exercise       Healthy diet/nutrition    Estimated body mass index is 23.32 kg/m  as calculated from the following:    Height as of 4/28/21: 1.803 m (5' 11\").    Weight as of 4/28/21: 75.8 kg (167 lb 3.2 oz).        He has no history on file for tobacco.    Appropriate preventive services were discussed with this patient, including applicable screening as appropriate for cardiovascular disease, diabetes, osteopenia/osteoporosis, and glaucoma.  As appropriate for age/gender, discussed screening for colorectal cancer, prostate cancer, breast cancer, and cervical cancer. Checklist reviewing preventive services available has been given to the patient.    Reviewed patients plan of care and provided an AVS. The Basic Care Plan (routine screening as documented in Health Maintenance) for Rubén meets the Care Plan requirement. This Care Plan has been established and reviewed with the Patient and caregiver.      Reed Momin MD  [facility] " [facility]

## 2021-05-12 NOTE — PATIENT INSTRUCTIONS
Patient Education   Personalized Prevention Plan  You are due for the preventive services outlined below.  Your care team is available to assist you in scheduling these services.  If you have already completed any of these items, please share that information with your care team to update in your medical record.  Health Maintenance Due   Topic Date Due     Liver Monitoring Lab  Never done     Basic Metabolic Panel  Never done     Heart Failure Action Plan  Never done     Cholesterol Lab  Never done     Thyroid Function Lab  Never done     ANNUAL REVIEW OF HM ORDERS  Never done     Depression Action Plan  Never done     Depression Assessment  Never done     Complete Blood Count  Never done     COVID-19 Vaccine (1) Never done     Annual Wellness Visit  Never done     FALL RISK ASSESSMENT  Never done

## 2021-05-13 ENCOUNTER — TELEPHONE (OUTPATIENT)
Dept: FAMILY MEDICINE | Facility: CLINIC | Age: 86
End: 2021-05-13

## 2021-05-13 ASSESSMENT — ANXIETY QUESTIONNAIRES: GAD7 TOTAL SCORE: 3

## 2021-05-13 NOTE — TELEPHONE ENCOUNTER
Reason for Call:  Other     Detailed comments: pt wife is calling and forgot to ask Dr. Momin about getting Rubén's COVID Vaccination?  When and Where ?    Phone Number Patient can be reached at: Home number on file [phone] (home)    Best Time: any    Can we leave a detailed message on this number? YES    Call taken on 5/13/2021 at 9:19 AM by Julieta Ash

## 2021-05-17 ENCOUNTER — IMMUNIZATION (OUTPATIENT)
Dept: FAMILY MEDICINE | Facility: CLINIC | Age: 86
End: 2021-05-17
Payer: COMMERCIAL

## 2021-05-17 PROCEDURE — 0011A PR COVID VAC MODERNA 100 MCG/0.5 ML IM: CPT

## 2021-05-17 PROCEDURE — 91301 PR COVID VAC MODERNA 100 MCG/0.5 ML IM: CPT

## 2021-05-18 ENCOUNTER — DOCUMENTATION ONLY (OUTPATIENT)
Dept: ADMINISTRATIVE | Facility: CLINIC | Age: 86
End: 2021-05-18

## 2021-05-20 ENCOUNTER — TELEPHONE (OUTPATIENT)
Dept: FAMILY MEDICINE | Facility: CLINIC | Age: 86
End: 2021-05-20

## 2021-05-20 ENCOUNTER — HOSPITAL ENCOUNTER (EMERGENCY)
Facility: CLINIC | Age: 86
Discharge: LEFT BEFORE DISCHARGE | End: 2021-05-20
Payer: COMMERCIAL

## 2021-05-20 VITALS
WEIGHT: 179 LBS | RESPIRATION RATE: 18 BRPM | BODY MASS INDEX: 27.13 KG/M2 | HEIGHT: 68 IN | DIASTOLIC BLOOD PRESSURE: 65 MMHG | OXYGEN SATURATION: 95 % | HEART RATE: 72 BPM | SYSTOLIC BLOOD PRESSURE: 110 MMHG | TEMPERATURE: 97.7 F

## 2021-05-20 ASSESSMENT — MIFFLIN-ST. JEOR: SCORE: 1456.44

## 2021-05-20 NOTE — TELEPHONE ENCOUNTER
Talked with homecare nurse and she tells me that Rubén's O2 sat down to 76 today on O2 at 2 L and walking 50-60 feet.  Took 3 minutes to get to 88.    In hospital in April 2021 for COVID and discharged with oxygen.  Home care nurse in process of  weaning O2 from him.   Had been going well until today    I talked with wife and home care nurse and advise going to the ED due to low O2 sat.  Phyllis Casas RN

## 2021-05-20 NOTE — ED NOTES
Pt's wife has had pulse ox on pt the entire stay in waiting room and oxygen has been at 95% and above on pt's 2 liter of oxygen.   Pt reports feeling better and does not want to be seen.   Pt and wife will come back to ER if any concerns or worsening.   AMA signed.    Pt's oxygen sats 97% on 2 liters of oxygen with pulse 70.

## 2021-05-25 ENCOUNTER — TELEPHONE (OUTPATIENT)
Dept: FAMILY MEDICINE | Facility: CLINIC | Age: 86
End: 2021-05-25

## 2021-05-25 NOTE — TELEPHONE ENCOUNTER
Reason for Call:  Home Health Care    Venessa  with [facility] called regarding (reason for call): Venessa is reporting new edema in his legs. BP is stable, O2 same, not worse. She recommended that he follow up with his provider and that he call. (He has scheduled apt to see Dr Momin tomorrow  5/26)    Orders are needed for this patient.        OT:1x week for 3 weeks starting next week to increase endurance. Please call with verbal orders for this.       Pt Provider: Reed Momin    Phone Number Homecare Nurse can be reached at: [phone]    Can we leave a detailed message on this number? YES      Call taken on 5/25/2021 at 3:48 PM by Maye Scherer

## 2021-05-25 NOTE — TELEPHONE ENCOUNTER
BRITTNEY Clifford, approved OT orders as requested.  Confirmed appt with Dr. Momin tomorrow.  CRESENCIO Pace RN

## 2021-05-26 ENCOUNTER — OFFICE VISIT (OUTPATIENT)
Dept: FAMILY MEDICINE | Facility: CLINIC | Age: 86
End: 2021-05-26
Payer: COMMERCIAL

## 2021-05-26 VITALS
WEIGHT: 179 LBS | BODY MASS INDEX: 27.22 KG/M2 | SYSTOLIC BLOOD PRESSURE: 98 MMHG | OXYGEN SATURATION: 88 % | HEART RATE: 72 BPM | DIASTOLIC BLOOD PRESSURE: 62 MMHG

## 2021-05-26 DIAGNOSIS — R30.0 BURNING WITH URINATION: ICD-10-CM

## 2021-05-26 DIAGNOSIS — R60.9 EDEMA, UNSPECIFIED TYPE: Primary | ICD-10-CM

## 2021-05-26 DIAGNOSIS — R09.02 HYPOXEMIA: ICD-10-CM

## 2021-05-26 LAB
ALBUMIN UR-MCNC: NEGATIVE MG/DL
APPEARANCE UR: CLEAR
BILIRUB UR QL STRIP: NEGATIVE
COLOR UR AUTO: YELLOW
GLUCOSE UR STRIP-MCNC: NEGATIVE MG/DL
HGB UR QL STRIP: ABNORMAL
KETONES UR STRIP-MCNC: NEGATIVE MG/DL
LEUKOCYTE ESTERASE UR QL STRIP: NEGATIVE
MUCOUS THREADS #/AREA URNS LPF: PRESENT /LPF
NITRATE UR QL: NEGATIVE
PH UR STRIP: 7 PH (ref 5–7)
RBC #/AREA URNS AUTO: ABNORMAL /HPF
SOURCE: ABNORMAL
SP GR UR STRIP: 1.02 (ref 1–1.03)
UROBILINOGEN UR STRIP-ACNC: 1 EU/DL (ref 0.2–1)
WBC #/AREA URNS AUTO: ABNORMAL /HPF

## 2021-05-26 PROCEDURE — 81001 URINALYSIS AUTO W/SCOPE: CPT | Performed by: FAMILY MEDICINE

## 2021-05-26 PROCEDURE — 99214 OFFICE O/P EST MOD 30 MIN: CPT | Performed by: FAMILY MEDICINE

## 2021-05-26 RX ORDER — FUROSEMIDE 20 MG
TABLET ORAL
Qty: 30 TABLET | Refills: 0 | Status: SHIPPED | OUTPATIENT
Start: 2021-05-26 | End: 2021-06-02

## 2021-05-26 RX ORDER — LISINOPRIL 40 MG/1
40 TABLET ORAL DAILY
COMMUNITY
End: 2022-06-13

## 2021-05-26 RX ORDER — ROSUVASTATIN CALCIUM 40 MG/1
40 TABLET, COATED ORAL DAILY
COMMUNITY

## 2021-05-26 NOTE — PROGRESS NOTES
A: hypoxemia, intermittent       Dysuria, normal UA       Edema feet, multifactorial    P: some lasix for 20 days.  See how he does.  May end up needing low dose long term ,may not    Will need labs, xray, ekg, etc if sx worsening, they know this, but want to keep it simple for now, which I agree.      30  min spent on date of encounter reviewing chart, history, physical, documenting and counseling as mentioned in this note       Subjective   S :Rubén Lane is a 88 year old male with some low sats off/on.  No lung dz before covid, had hospitalization, long recovery, sent home with oxygen.      Has been doing OK, but breathing slow to recover.  Some swelling in feet, mild.      No cp or acute sob.  No pleurisy.  Eating and sleeping well    No sob at rest, only uses oxygen prn and every night during sleep    Also some intermittent burning with urination.      Has complex heart hx.  30% ef, afib intermittently, ICD    O:BP 98/62   Pulse 72   Wt 81.2 kg (179 lb)   SpO2 (!) 88%   BMI 27.22 kg/m    GEN: Alert and oriented, in no acute distress  Has some crackles at bases bilaterally, but good air movement.    1+ edema feet, not into ankles  RRR, hard to hear,  But regular.    In good spirits, not sob sitting there.  Not using oxygen talking to me    UA normal

## 2021-06-01 ENCOUNTER — TELEPHONE (OUTPATIENT)
Dept: FAMILY MEDICINE | Facility: CLINIC | Age: 86
End: 2021-06-01

## 2021-06-01 DIAGNOSIS — R60.9 EDEMA, UNSPECIFIED TYPE: ICD-10-CM

## 2021-06-01 NOTE — TELEPHONE ENCOUNTER
I talked with Shekhar.  Shekhar () states that Rubén is stable, but breathing limited and on continuous O2 at 2 L/min.   Is taking furosemide 20 mg.  5/26/21 furosemide order was BID for 10 days and then every day.  Still taking BID.  Swelling better.   BP 83/50 today.  BP at last visit was 98/62.  Please advise on furosemide dosing.  This is OK to wait Sarah Casas RN

## 2021-06-02 RX ORDER — FUROSEMIDE 20 MG
TABLET ORAL
Qty: 30 TABLET | Refills: 0 | COMMUNITY
Start: 2021-06-02 | End: 2023-09-07

## 2021-06-02 NOTE — TELEPHONE ENCOUNTER
I talked with Mayelin.  She says Rubén's weight has been 159-168 past five days.  Gave her Dr Momin's message.    Phyllis Cassa RN

## 2021-06-09 ENCOUNTER — TELEPHONE (OUTPATIENT)
Dept: FAMILY MEDICINE | Facility: CLINIC | Age: 86
End: 2021-06-09

## 2021-06-09 ENCOUNTER — MEDICAL CORRESPONDENCE (OUTPATIENT)
Dept: HEALTH INFORMATION MANAGEMENT | Facility: CLINIC | Age: 86
End: 2021-06-09

## 2021-06-09 DIAGNOSIS — Z53.9 DIAGNOSIS NOT YET DEFINED: Primary | ICD-10-CM

## 2021-06-09 PROCEDURE — G0180 MD CERTIFICATION HHA PATIENT: HCPCS | Performed by: FAMILY MEDICINE

## 2021-06-09 NOTE — TELEPHONE ENCOUNTER
Form receive back signed 6/9/21    Faxed back and sent to be scanned in to [facility] 6/9/21    [facility] Sec

## 2021-06-14 ENCOUNTER — IMMUNIZATION (OUTPATIENT)
Dept: FAMILY MEDICINE | Facility: CLINIC | Age: 86
End: 2021-06-14
Attending: FAMILY MEDICINE
Payer: COMMERCIAL

## 2021-06-14 PROCEDURE — 91301 PR COVID VAC MODERNA 100 MCG/0.5 ML IM: CPT

## 2021-06-14 PROCEDURE — 0012A PR COVID VAC MODERNA 100 MCG/0.5 ML IM: CPT

## 2021-06-21 ENCOUNTER — TELEPHONE (OUTPATIENT)
Dept: FAMILY MEDICINE | Facility: CLINIC | Age: 86
End: 2021-06-21

## 2021-06-21 NOTE — TELEPHONE ENCOUNTER
Reason for Call:  Other FYI    Detailed comments: Patient is being discharged next week, tolerating all activity on room air; recommend change O2 orders to PRN 1 liter as needed.    Phone Number Patient can be reached at: Other phone number:  Lissette BARROW,, [facility], [phone]    Best Time:Any*    Can we leave a detailed message on this number? YES    Call taken on 6/21/2021 at 3:29 PM by Cherise Knutson

## 2021-06-21 NOTE — TELEPHONE ENCOUNTER
Reason for Call:  Other FYI    Detailed comments: Venessa from [facility] states pt is breathing room air most of the time. States pt uses oxygen at night or when it's humid. Venessa also states PCP might want to talk to pt about possibly weaning off oxygen. Pt is no longer doing OT and is now just doing PT.    Phone Number Patient can be reached at: Other phone number:  [phone]    Best Time: any- call if there are questions    Can we leave a detailed message on this number? NO    Call taken on 6/21/2021 at 1:35 PM by Gabrielle Parker

## 2021-06-23 NOTE — TELEPHONE ENCOUNTER
If patient is  Feeling less need for oxygen, can use less over time, and eventually get off entirely if he doesn't think he needs it.

## 2021-06-24 DIAGNOSIS — I25.5 ISCHEMIC CARDIOMYOPATHY: Primary | ICD-10-CM

## 2021-06-28 ENCOUNTER — MEDICAL CORRESPONDENCE (OUTPATIENT)
Dept: HEALTH INFORMATION MANAGEMENT | Facility: CLINIC | Age: 86
End: 2021-06-28

## 2021-06-28 DIAGNOSIS — I25.5 ISCHEMIC CARDIOMYOPATHY: ICD-10-CM

## 2021-06-28 LAB
ANION GAP SERPL CALCULATED.3IONS-SCNC: 5 MMOL/L (ref 3–14)
BUN SERPL-MCNC: 7 MG/DL (ref 7–30)
CALCIUM SERPL-MCNC: 8.7 MG/DL (ref 8.5–10.1)
CHLORIDE SERPL-SCNC: 107 MMOL/L (ref 94–109)
CO2 SERPL-SCNC: 27 MMOL/L (ref 20–32)
CREAT SERPL-MCNC: 0.66 MG/DL (ref 0.66–1.25)
GFR SERPL CREATININE-BSD FRML MDRD: 86 ML/MIN/{1.73_M2}
GLUCOSE SERPL-MCNC: 96 MG/DL (ref 70–99)
POTASSIUM SERPL-SCNC: 4.8 MMOL/L (ref 3.4–5.3)
SODIUM SERPL-SCNC: 139 MMOL/L (ref 133–144)

## 2021-06-28 PROCEDURE — 80048 BASIC METABOLIC PNL TOTAL CA: CPT | Performed by: PHYSICIAN ASSISTANT

## 2021-06-28 PROCEDURE — 36415 COLL VENOUS BLD VENIPUNCTURE: CPT | Performed by: PHYSICIAN ASSISTANT

## 2021-06-30 ENCOUNTER — MEDICAL CORRESPONDENCE (OUTPATIENT)
Dept: HEALTH INFORMATION MANAGEMENT | Facility: CLINIC | Age: 86
End: 2021-06-30

## 2021-07-07 ENCOUNTER — TELEPHONE (OUTPATIENT)
Dept: FAMILY MEDICINE | Facility: CLINIC | Age: 86
End: 2021-07-07

## 2021-07-07 NOTE — TELEPHONE ENCOUNTER
Form receive back signed 6/30/21    Faxed back and sent to be scanned in to [facility] 7/7/21    [facility] Sec

## 2021-07-21 ENCOUNTER — MEDICAL CORRESPONDENCE (OUTPATIENT)
Dept: HEALTH INFORMATION MANAGEMENT | Facility: CLINIC | Age: 86
End: 2021-07-21

## 2021-07-21 ENCOUNTER — TELEPHONE (OUTPATIENT)
Dept: FAMILY MEDICINE | Facility: CLINIC | Age: 86
End: 2021-07-21

## 2021-07-21 NOTE — TELEPHONE ENCOUNTER
Form receive back signed 7/21/21    Faxed back and sent to be scanned in to epic 7/21/21    [facility] Sec

## 2021-07-26 ENCOUNTER — MEDICAL CORRESPONDENCE (OUTPATIENT)
Dept: HEALTH INFORMATION MANAGEMENT | Facility: CLINIC | Age: 86
End: 2021-07-26

## 2021-07-26 ENCOUNTER — TELEPHONE (OUTPATIENT)
Dept: FAMILY MEDICINE | Facility: CLINIC | Age: 86
End: 2021-07-26

## 2021-07-26 NOTE — TELEPHONE ENCOUNTER
Form receive back signed 7/26/21    Faxed back and sent to be scanned in to epic 7/26/21      [facility] Sec

## 2021-09-20 ENCOUNTER — OFFICE VISIT (OUTPATIENT)
Dept: URGENT CARE | Facility: URGENT CARE | Age: 86
End: 2021-09-20
Payer: COMMERCIAL

## 2021-09-20 VITALS
SYSTOLIC BLOOD PRESSURE: 110 MMHG | HEIGHT: 68 IN | OXYGEN SATURATION: 97 % | HEART RATE: 86 BPM | RESPIRATION RATE: 16 BRPM | WEIGHT: 180 LBS | BODY MASS INDEX: 27.28 KG/M2 | DIASTOLIC BLOOD PRESSURE: 66 MMHG | TEMPERATURE: 96.8 F

## 2021-09-20 DIAGNOSIS — L98.9 FACIAL LESION: Primary | ICD-10-CM

## 2021-09-20 PROCEDURE — 99213 OFFICE O/P EST LOW 20 MIN: CPT | Performed by: PHYSICIAN ASSISTANT

## 2021-09-20 ASSESSMENT — MIFFLIN-ST. JEOR: SCORE: 1460.97

## 2021-09-20 NOTE — PROGRESS NOTES
"    Assessment & Plan     Facial lesion  Will have patient follow up with dermatology. Patient agrees with plan.     - Adult Dermatology Referral; Future                 Return in about 1 week (around 9/27/2021) for Follow up.    COLEEN [facility]          Subjective   Chief Complaint   Patient presents with     Derm Problem     1 Month patient has a growth on the left side of face, red,scabbed.         HPI     Derm problem   Onset of symptoms was 1 month(s) ago.  Course of illness is worsening.    Severity mild  Current and Associated symptoms: facial lesion on face   Treatment measures tried include None tried.  Predisposing factors include None.    History of basal cell carcinoma           Review of Systems   Constitutional, HEENT, cardiovascular, pulmonary, gi and gu systems are negative, except as otherwise noted.      Objective    /66 (BP Location: Right arm, Patient Position: Sitting, Cuff Size: Adult Regular)   Pulse 86   Temp 96.8  F (36  C) (Tympanic)   Resp 16   Ht 1.727 m (5' 8\")   Wt 81.6 kg (180 lb)   SpO2 97%   BMI 27.37 kg/m    Body mass index is 27.37 kg/m .  Physical Exam  Constitutional:       General: He is not in acute distress.  HENT:      Mouth/Throat:        Comments: Scabbed circular lesion to the left side of mouth   Neurological:      Mental Status: He is alert.                        "

## 2021-09-23 ENCOUNTER — OFFICE VISIT (OUTPATIENT)
Dept: URGENT CARE | Facility: URGENT CARE | Age: 86
End: 2021-09-23
Payer: COMMERCIAL

## 2021-09-23 ENCOUNTER — ALLIED HEALTH/NURSE VISIT (OUTPATIENT)
Dept: FAMILY MEDICINE | Facility: CLINIC | Age: 86
End: 2021-09-23
Payer: COMMERCIAL

## 2021-09-23 VITALS
BODY MASS INDEX: 27.28 KG/M2 | OXYGEN SATURATION: 98 % | TEMPERATURE: 97.6 F | RESPIRATION RATE: 16 BRPM | DIASTOLIC BLOOD PRESSURE: 70 MMHG | SYSTOLIC BLOOD PRESSURE: 104 MMHG | HEIGHT: 68 IN | WEIGHT: 180 LBS | HEART RATE: 66 BPM

## 2021-09-23 DIAGNOSIS — T14.8XXA OPEN WOUND: Primary | ICD-10-CM

## 2021-09-23 DIAGNOSIS — T14.8XXA MULTIPLE SKIN TEARS: Primary | ICD-10-CM

## 2021-09-23 PROCEDURE — 99213 OFFICE O/P EST LOW 20 MIN: CPT | Performed by: NURSE PRACTITIONER

## 2021-09-23 PROCEDURE — 99207 PR NO CHARGE NURSE ONLY: CPT

## 2021-09-23 ASSESSMENT — MIFFLIN-ST. JEOR: SCORE: 1460.97

## 2021-09-23 NOTE — PATIENT INSTRUCTIONS
Leave Tegaderm in place. Replace if they come off.    Watch for any increased redness, fever, etc in case an infection starts. Be seen immediately should this occur.  Follow-up with your primary care provider next week and as needed.    Indications for emergent return to emergency department discussed with patient, who verbalized good understanding and agreement.  Patient understands the limitations of today's evaluation.         Patient Education     Skin Tear (Skin Avulsion)  A skin tear (skin avulsion) is a tearing of the top layer of skin. This commonly happens after a fall or other injury. This is especially true if you have thinner skin, are an older adult, or have taken steroids for long periods of time.   Home care  These guidelines will help you care for your wound at home:    Keep the wound clean and dry for the first 24 to 48 hours, or as your healthcare provider advises.    If there is a dressing or bandage, change it when it gets wet or dirty. Otherwise, leave it on for the first 24 hours, then change it once a day or as often as the healthcare provider says.    If stitches or staples were used, check the wound every day.    After taking off the dressing, wash the area gently with soap and water. Clean as close to the stitches as you can. Don't wash or rub the stitches directly.    After 3 days you can keep the bandages off the wound, unless told otherwise, or there is continued drainage.  Allow the wound to be open to the air.    Keep a thin layer of antibiotic ointment on the cut. This will keep the wound clean, make it easier to remove the stitches, and reduce scarring.    If your wound is oozing, you can put a nonstick dressing over it. Then, reapply the bandage or dressing as you were told.    You can shower as usual after the first 24 hours, but don't soak the area in water (no baths or swimming) until the stitches or staples are taken out.    If surgical tape was used, keep the area clean and  dry. If it becomes wet, blot it dry with a clean towel.    Be very careful when removing tape or other dressings, or you may cause more skin tears. Soaking the dressing in the shower for a few minutes will often loosen it and make it easier to remove.    If skin glue was used, don't put any creams, lotions, or antibiotic ointments on it. These can dissolve the glue. Usually the glue will flake off in about 5 to 10 days by itself. Try to resist picking it off before that so the wound doesn't open up. When it gets wet, pat it dry.  Here is some information about medicine:    You may use over-the-counter medicine such as acetaminophen, naproxen, or ibuprofen to control pain, unless another pain medicine was given. If you have chronic liver or kidney disease or ever had a stomach ulcer or gastrointestinal bleeding, talk with your healthcare provider before using these medicines.    If you were given antibiotics, take them until they are all used up. It is important to finish the antibiotics even if the wound looks better. This will ensure that the infection has cleared.  Follow-up care  Follow up with your healthcare provider, or as advised.    Watch for any signs of infection, such as increasing redness, swelling, or pus coming out of the wound. If this happens, don't wait for your scheduled visit. Instead, see your healthcare provider right away.    Stitches or staples are usually taken out within 5 to 14 days. This varies depending on what part of your body they are on, and the type of wound. Your provider will tell you how long stitches or staples should be left in.     If surgical tape was used, it's usually left on for 7 to 10 days. You can remove surgical tape after that unless you were told otherwise. If you try to remove it, and it's too hard, soaking can help. Surgical tape strips will eventually fall off on their own. If the edges of the cut pull apart, stop removing the tape or strips and follow up with your  provider    As mentioned above, skin glue will flake off by itself in 5 to 10 days, so you don't need to pull it off.  If any X-rays were done, you will be notified of any changes that may affect your care.   When to seek medical advice  Call your healthcare provider right away if any of these occur:    Increasing pain in the wound    Redness, swelling, or pus coming from the wound    Fever of 100.4 F (38 C) or higher, or as directed by your healthcare provider    Sutures or staples come apart or fall out before your next appointment and the wound edges look as if they will re-open    Surgical tape closures fall off before 7 days, and the wound edges look as if they will re-open    Bleeding not controlled by direct pressure  Maggie last reviewed this educational content on 8/1/2019 2000-2021 [employer]. All rights reserved. This information is not intended as a substitute for professional medical care. Always follow your healthcare professional's instructions.

## 2021-09-23 NOTE — PROGRESS NOTES
Assessment & Plan   Problem List Items Addressed This Visit     None      Visit Diagnoses     Multiple skin tears    -  Primary             25 minutes spent on the date of the encounter doing chart review, history and exam, documentation and further activities per the note       Patient Instructions   Leave Tegaderm in place. Replace if they come off.    Watch for any increased redness, fever, etc in case an infection starts. Be seen immediately should this occur.  Follow-up with your primary care provider next week and as needed.    Indications for emergent return to emergency department discussed with patient, who verbalized good understanding and agreement.  Patient understands the limitations of today's evaluation.         Patient Education     Skin Tear (Skin Avulsion)  A skin tear (skin avulsion) is a tearing of the top layer of skin. This commonly happens after a fall or other injury. This is especially true if you have thinner skin, are an older adult, or have taken steroids for long periods of time.   Home care  These guidelines will help you care for your wound at home:    Keep the wound clean and dry for the first 24 to 48 hours, or as your healthcare provider advises.    If there is a dressing or bandage, change it when it gets wet or dirty. Otherwise, leave it on for the first 24 hours, then change it once a day or as often as the healthcare provider says.    If stitches or staples were used, check the wound every day.    After taking off the dressing, wash the area gently with soap and water. Clean as close to the stitches as you can. Don't wash or rub the stitches directly.    After 3 days you can keep the bandages off the wound, unless told otherwise, or there is continued drainage.  Allow the wound to be open to the air.    Keep a thin layer of antibiotic ointment on the cut. This will keep the wound clean, make it easier to remove the stitches, and reduce scarring.    If your wound is oozing,  you can put a nonstick dressing over it. Then, reapply the bandage or dressing as you were told.    You can shower as usual after the first 24 hours, but don't soak the area in water (no baths or swimming) until the stitches or staples are taken out.    If surgical tape was used, keep the area clean and dry. If it becomes wet, blot it dry with a clean towel.    Be very careful when removing tape or other dressings, or you may cause more skin tears. Soaking the dressing in the shower for a few minutes will often loosen it and make it easier to remove.    If skin glue was used, don't put any creams, lotions, or antibiotic ointments on it. These can dissolve the glue. Usually the glue will flake off in about 5 to 10 days by itself. Try to resist picking it off before that so the wound doesn't open up. When it gets wet, pat it dry.  Here is some information about medicine:    You may use over-the-counter medicine such as acetaminophen, naproxen, or ibuprofen to control pain, unless another pain medicine was given. If you have chronic liver or kidney disease or ever had a stomach ulcer or gastrointestinal bleeding, talk with your healthcare provider before using these medicines.    If you were given antibiotics, take them until they are all used up. It is important to finish the antibiotics even if the wound looks better. This will ensure that the infection has cleared.  Follow-up care  Follow up with your healthcare provider, or as advised.    Watch for any signs of infection, such as increasing redness, swelling, or pus coming out of the wound. If this happens, don't wait for your scheduled visit. Instead, see your healthcare provider right away.    Stitches or staples are usually taken out within 5 to 14 days. This varies depending on what part of your body they are on, and the type of wound. Your provider will tell you how long stitches or staples should be left in.     If surgical tape was used, it's usually left on  for 7 to 10 days. You can remove surgical tape after that unless you were told otherwise. If you try to remove it, and it's too hard, soaking can help. Surgical tape strips will eventually fall off on their own. If the edges of the cut pull apart, stop removing the tape or strips and follow up with your provider    As mentioned above, skin glue will flake off by itself in 5 to 10 days, so you don't need to pull it off.  If any X-rays were done, you will be notified of any changes that may affect your care.   When to seek medical advice  Call your healthcare provider right away if any of these occur:    Increasing pain in the wound    Redness, swelling, or pus coming from the wound    Fever of 100.4 F (38 C) or higher, or as directed by your healthcare provider    Sutures or staples come apart or fall out before your next appointment and the wound edges look as if they will re-open    Surgical tape closures fall off before 7 days, and the wound edges look as if they will re-open    Bleeding not controlled by direct pressure  Maggie last reviewed this educational content on 8/1/2019 2000-2021 [employer]. All rights reserved. This information is not intended as a substitute for professional medical care. Always follow your healthcare professional's instructions.               Return in about 3 days (around 9/26/2021), or if symptoms worsen or fail to improve, for Follow up with your primary care provider.    HILDA Slater [facility]    Lainey Montana is a 88 year old who presents for the following health issues     HPI     Chief Complaint   Patient presents with     Derm Problem     9/22/21 Patient fell off a short stool and injured both elbows with skin tears, thin skin.           Review of Systems   Constitutional, HEENT, cardiovascular, pulmonary, GI, , musculoskeletal, neuro, skin, endocrine and psych systems are negative, except as  "otherwise noted.      Objective    /70 (BP Location: Left arm, Patient Position: Sitting, Cuff Size: Adult Regular)   Pulse 66   Temp 97.6  F (36.4  C) (Tympanic)   Resp 16   Ht 1.727 m (5' 8\")   Wt 81.6 kg (180 lb)   SpO2 98%   BMI 27.37 kg/m    Body mass index is 27.37 kg/m .  Physical Exam   GENERAL: healthy, alert and no distress,nontoxic in appearance  EYES: Eyes grossly normal to inspection, PERRL and conjunctivae and sclerae normal  HENT:normocephalic and atraumatic  NECK: supple with full ROM  ABDOMEN: soft, nontender  MS: no gross musculoskeletal defects noted, no edema  2 skin tears on right arm, by elbow and above elbow and one on elbow on left side. They are clean and no sign of infection. Cleansed, Tegaderm applied. Pressure dressing on one upper right arm as it is oozing a little blood as he is on blood thinner Eliquis.    No results found for this or any previous visit (from the past 24 hour(s)).            "

## 2021-09-23 NOTE — PROGRESS NOTES
Patient leaving clinic following care for fall with skin tears. Bleeding through dressings.    1: upper right arm.   Tegaderm removed as blood is dripping. Wound cleansed with wound cleanser. Sterile 4x4 held in place several minutes to stop bleeding. Dressed with Vaseline gauze, covered with sterile 4x4 x 2, secured with kerlix then coban assuring coban not tight.     2: Upper right forearm, at elbow.  Tegaderm removed as blood is dripping. Sterile 4x4 held in place several minutes to stop bleeding. Wound cleansed with wound . One steri strip applied. Dressed with Vasiline gauze, covered with 1 folded sterile 4x4, secured with kerlix then coban assuring coban not tight.    3: upper left arm.  Tegaderm removed. Wound cleansed with wound cleanser. Dressed with Vasiline gauze, covered with 2 sterile 4x4, secured with kerlix then coban assuring coban not tight.    Instructed to change dressing daily as I demonstrated. Instructed to leave steri strip in place until it falls off. Patient and wife states understanding.  Supplies home with patient.    Patient tolerated cares well.    Tonia LOPEZ RN

## 2021-10-16 ENCOUNTER — LAB (OUTPATIENT)
Dept: LAB | Facility: CLINIC | Age: 86
End: 2021-10-16
Payer: COMMERCIAL

## 2021-10-16 DIAGNOSIS — I50.22 CHRONIC SYSTOLIC HEART FAILURE (H): Primary | ICD-10-CM

## 2021-10-16 DIAGNOSIS — I50.22 CHRONIC SYSTOLIC HEART FAILURE (H): ICD-10-CM

## 2021-10-16 DIAGNOSIS — E78.5 HYPERLIPEMIA: ICD-10-CM

## 2021-10-16 LAB
ANION GAP SERPL CALCULATED.3IONS-SCNC: 4 MMOL/L (ref 3–14)
BUN SERPL-MCNC: 11 MG/DL (ref 7–30)
CALCIUM SERPL-MCNC: 8.3 MG/DL (ref 8.5–10.1)
CHLORIDE BLD-SCNC: 114 MMOL/L (ref 94–109)
CHOLEST SERPL-MCNC: 126 MG/DL
CO2 SERPL-SCNC: 29 MMOL/L (ref 20–32)
CREAT SERPL-MCNC: 0.62 MG/DL (ref 0.66–1.25)
FASTING STATUS PATIENT QL REPORTED: YES
GFR SERPL CREATININE-BSD FRML MDRD: 89 ML/MIN/1.73M2
GLUCOSE BLD-MCNC: 98 MG/DL (ref 70–99)
HDLC SERPL-MCNC: 60 MG/DL
LDLC SERPL CALC-MCNC: 52 MG/DL
NONHDLC SERPL-MCNC: 66 MG/DL
POTASSIUM BLD-SCNC: 4.6 MMOL/L (ref 3.4–5.3)
SODIUM SERPL-SCNC: 147 MMOL/L (ref 133–144)
TRIGL SERPL-MCNC: 71 MG/DL

## 2021-10-16 PROCEDURE — 36415 COLL VENOUS BLD VENIPUNCTURE: CPT

## 2021-10-16 PROCEDURE — 80061 LIPID PANEL: CPT

## 2021-10-16 PROCEDURE — 80048 BASIC METABOLIC PNL TOTAL CA: CPT

## 2021-11-18 ENCOUNTER — OFFICE VISIT (OUTPATIENT)
Dept: DERMATOLOGY | Facility: CLINIC | Age: 86
End: 2021-11-18
Attending: PHYSICIAN ASSISTANT
Payer: COMMERCIAL

## 2021-11-18 VITALS — HEART RATE: 65 BPM | SYSTOLIC BLOOD PRESSURE: 118 MMHG | DIASTOLIC BLOOD PRESSURE: 68 MMHG | OXYGEN SATURATION: 96 %

## 2021-11-18 DIAGNOSIS — D48.5 NEOPLASM OF UNCERTAIN BEHAVIOR OF SKIN: ICD-10-CM

## 2021-11-18 DIAGNOSIS — Z85.828 HISTORY OF NONMELANOMA SKIN CANCER: ICD-10-CM

## 2021-11-18 DIAGNOSIS — L82.0 INFLAMED SEBORRHEIC KERATOSIS: ICD-10-CM

## 2021-11-18 DIAGNOSIS — L82.1 SEBORRHEIC KERATOSIS: ICD-10-CM

## 2021-11-18 DIAGNOSIS — L57.0 ACTINIC KERATOSIS: Primary | ICD-10-CM

## 2021-11-18 PROCEDURE — 88305 TISSUE EXAM BY PATHOLOGIST: CPT | Performed by: DERMATOLOGY

## 2021-11-18 PROCEDURE — 11102 TANGNTL BX SKIN SINGLE LES: CPT | Mod: 59 | Performed by: PHYSICIAN ASSISTANT

## 2021-11-18 PROCEDURE — 17003 DESTRUCT PREMALG LES 2-14: CPT | Mod: 59 | Performed by: PHYSICIAN ASSISTANT

## 2021-11-18 PROCEDURE — 99203 OFFICE O/P NEW LOW 30 MIN: CPT | Mod: 25 | Performed by: PHYSICIAN ASSISTANT

## 2021-11-18 PROCEDURE — 11103 TANGNTL BX SKIN EA SEP/ADDL: CPT | Mod: 59 | Performed by: PHYSICIAN ASSISTANT

## 2021-11-18 PROCEDURE — 17000 DESTRUCT PREMALG LESION: CPT | Mod: 59 | Performed by: PHYSICIAN ASSISTANT

## 2021-11-18 PROCEDURE — 17110 DESTRUCTION B9 LES UP TO 14: CPT | Performed by: PHYSICIAN ASSISTANT

## 2021-11-18 NOTE — PATIENT INSTRUCTIONS
WOUND CARE INSTRUCTIONS   FOR CRYOSURGERY   This area treated with liquid nitrogen should form a blister (areas treated may or may not blister-skin may just turn dark and slough off). You do not need to bandage the area unless a blister forms and breaks (which may be a few days). When the blister breaks, begin daily dressing changes as follows:  1) Clean and dry the area with tap water using clean Q-tip or sterile gauze pad.   2) Apply Polysporin ointment or Bacitracin ointment over entire wound. Do NOT use Neosporin ointment.   3) Cover the wound with a band-aid or sterile non-stick gauze pad and micropore paper tape.   REPEAT THESE INSTRUCTIONS AT LEAST ONCE A DAY UNTIL THE WOUND HAS COMPLETELY HEALED.   It is an old wives tale that a wound heals better when it is exposed to air and allowed to dry out. The wound will heal faster with a better cosmetic result if it is kept moist with ointment and covered with a bandage.   Do not let the wound dry out.   IMPORTANT INFORMATION ON REVERSE SIDE   Supplies Needed:   *Cotton tipped applicators (Q-tips)   *Polysporin ointment or Bacitracin ointment (NOT NEOSPORIN)   *Band-aids, or non stick gauze pads and micropore paper tape   PATIENT INFORMATION   During the healing process you will notice a number of changes. All wounds develop a small halo of redness surrounding the wound. This means healing is occurring. Severe itching with extensive redness usually indicates sensitivity to the ointment or bandage tape used to dress the wound. You should call our office if this develops.   Swelling and/or discoloration around your surgical site is common, particularly when performed around the eye.   All wounds normally drain. The larger the wound the more drainage there will be. After 7-10 days, you will notice the wound beginning to shrink and new skin will begin to grow. The wound is healed when you can see skin has formed over the entire area. A healed wound has a healthy, shiny  look to the surface and is red to dark pink in color to normalize. Wounds may take approximately 4-6 weeks to heal. Larger wounds may take 6-8 weeks. After the wound is healed you may discontinue dressing changes.   You may experience a sensation of tightness as your wound heals. This is normal and will gradually subside.   Your healed wound may be sensitive to temperature changes. This sensitivity improves with time, but if you re having a lot of discomfort, try to avoid temperature extremes.   Patients frequently experience itching after their wound appears to have healed because of the continue healing under the skin. Plain Vaseline will help relieve the itching.             Wound Care Instructions     FOR SUPERFICIAL WOUNDS     [facility] [phone]    [facility] [phone]                       AFTER 24 HOURS YOU SHOULD REMOVE THE BANDAGE AND BEGIN DAILY DRESSING CHANGES AS FOLLOWS:     1) Remove Dressing.     2) Clean and dry the area with tap water using a Q-tip or sterile gauze pad.     3) Apply Vaseline, Aquaphor, Polysporin ointment or Bacitracin ointment over entire wound.  Do NOT use Neosporin ointment.     4) Cover the wound with a band-aid, or a sterile non-stick gauze pad and micropore paper tape      REPEAT THESE INSTRUCTIONS AT LEAST ONCE A DAY UNTIL THE WOUND HAS COMPLETELY HEALED.    It is an old wives tale that a wound heals better when it is exposed to air and allowed to dry out. The wound will heal faster with a better cosmetic result if it is kept moist with ointment and covered with a bandage.    **Do not let the wound dry out.**      Supplies Needed:      *Cotton tipped applicators (Q-tips)    *Polysporin Ointment or Bacitracin Ointment (NOT NEOSPORIN)    *Band-aids or non-stick gauze pads and micropore paper tape.      PATIENT INFORMATION:    During the healing process you will notice a number of changes. All wounds develop a small halo of redness surrounding  the wound.  This means healing is occurring. Severe itching with extensive redness usually indicates sensitivity to the ointment or bandage tape used to dress the wound.  You should call our office if this develops.      Swelling  and/or discoloration around your surgical site is common, particularly when performed around the eye.    All wounds normally drain.  The larger the wound the more drainage there will be.  After 7-10 days, you will notice the wound beginning to shrink and new skin will begin to grow.  The wound is healed when you can see skin has formed over the entire area.  A healed wound has a healthy, shiny look to the surface and is red to dark pink in color to normalize.  Wounds may take approximately 4-6 weeks to heal.  Larger wounds may take 6-8 weeks.  After the wound is healed you may discontinue dressing changes.    You may experience a sensation of tightness as your wound heals. This is normal and will gradually subside.    Your healed wound may be sensitive to temperature changes. This sensitivity improves with time, but if you re having a lot of discomfort, try to avoid temperature extremes.    Patients frequently experience itching after their wound appears to have healed because of the continue healing under the skin.  Plain Vaseline will help relieve the itching.        POSSIBLE COMPLICATIONS    BLEEDIN. Leave the bandage in place.  2. Use tightly rolled up gauze or a cloth to apply direct pressure over the bandage for 30  minutes.  3. Reapply pressure for an additional 30 minutes if necessary  4. Use additional gauze and tape to maintain pressure once the bleeding has stopped.  5.

## 2021-11-18 NOTE — PROGRESS NOTES
Rubén Lane is an extremely pleasant 88 year old year old male patient here today for spot on left lower lip. He notes it was worse a few weeks ago but now getting smaller in size.  Patient has no other skin complaints today.  Remainder of the HPI, Meds, PMH, Allergies, FH, and SH was reviewed in chart.    Pertinent Hx:   History of NMSC  No past medical history on file.    No past surgical history on file.     No family history on file.    Social History     Socioeconomic History     Marital status:      Spouse name: Not on file     Number of children: Not on file     Years of education: Not on file     Highest education level: Not on file   Occupational History     Not on file   Tobacco Use     Smoking status: Former Smoker     Smokeless tobacco: Never Used   Substance and Sexual Activity     Alcohol use: Not on file     Drug use: Not on file     Sexual activity: Not on file   Other Topics Concern     Not on file   Social History Narrative     Not on file     Social Determinants of Health     Financial Resource Strain: Not on file   Food Insecurity: Not on file   Transportation Needs: Not on file   Physical Activity: Not on file   Stress: Not on file   Social Connections: Not on file   Intimate Partner Violence: Not on file   Housing Stability: Not on file       Outpatient Encounter Medications as of 11/18/2021   Medication Sig Dispense Refill     aspirin (ASPIRIN ADULT LOW DOSE) 81 MG EC tablet Take 81 mg by mouth daily       Ferrous Gluconate-C-Folic Acid (IRON-C PO) Take 65 mg by mouth       FLUoxetine (PROZAC) 20 MG capsule Take 40 mg by mouth daily       lisinopril (ZESTRIL) 40 MG tablet Take 40 mg by mouth daily       metoprolol tartrate (LOPRESSOR) 25 MG tablet Take 25 mg by mouth 2 times daily       rosuvastatin (CRESTOR) 40 MG tablet Take 40 mg by mouth daily       sotalol (BETAPACE) 80 MG tablet Take 80 mg by mouth daily       acetaminophen (TYLENOL) 500 MG tablet Take 1,000 mg by mouth 3 times  daily (Patient not taking: Reported on 9/20/2021)       albuterol (PROAIR HFA/PROVENTIL HFA/VENTOLIN HFA) 108 (90 Base) MCG/ACT inhaler Inhale 2 puffs into the lungs 4 times daily (Patient not taking: Reported on 9/20/2021)       apixaban ANTICOAGULANT (ELIQUIS) 5 MG tablet Take 5 mg by mouth 2 times daily (Patient not taking: Reported on 9/20/2021)       atorvastatin (LIPITOR) 80 MG tablet Take 80 mg by mouth every evening       furosemide (LASIX) 20 MG tablet One daily til med gone 30 tablet 0     nitroGLYcerin (NITROSTAT) 0.4 MG sublingual tablet Place 0.4 mg under the tongue every 5 minutes as needed for chest pain For chest pain place 1 tablet under the tongue every 5 minutes for 3 doses. If symptoms persist 5 minutes after 1st dose call 911. (Patient not taking: Reported on 9/20/2021)       rivaroxaban ANTICOAGULANT (XARELTO) 20 MG TABS tablet Take 20 mg by mouth daily (with dinner)       No facility-administered encounter medications on file as of 11/18/2021.             O:   NAD, WDWN, Alert & Oriented, Mood & Affect wnl, Vitals stable   Here today alone   /68   Pulse 65   SpO2 96%    General appearance normal   Vitals stable   Alert, oriented and in no acute distress     0.8 cm pink scaly papule on right superior helix   0.7 cm scaly papule on left lower lip   Gritty papule on nose x 1, left jaw x 2  Stuck on papules and brown macules on trunk and ext   Red papules on trunk  Brown papules and macules with regular pigment network and borders on torso and extremities      The remainder of skin exam is normal       Eyes: Conjunctivae/lids:Normal     ENT: Lips: normal    MSK:Normal    Pulm: Breathing Normal    Lymph Nodes: No Head and Neck Lymphadenopathy     Neuro/Psych: Orientation:Alert and Orientedx3 ; Mood/Affect:normal   A/P:  1. R/O SCC vs hypertrophic ak on right helix   TANGENTIAL BIOPSY SENT OUT:  After consent, anesthesia with LEC and prep, tangential excision performed and specimen sent out  for permanent section histology.  No complications and routine wound care. Patient told to call our office in 1-2 weeks for result.      2. R/O inflamed seborrheic keratosis on left lower cutaneous lip   LN2:  Treated with LN2 for 5s for 1-2 cycles. Warned risks of blistering, pain, pigment change, scarring, and incomplete resolution.  Advised patient to return if lesions do not completely resolve.  Wound care sheet given.  3. Actinic keratosis on left jaw and nose x 3  LN2:  Treated with LN2 for 5s for 1-2 cycles. Warned risks of blistering, pain, pigment change, scarring, and incomplete resolution.  Advised patient to return if lesions do not completely resolve.  Wound care sheet given.  3. Inflamed seborrheic keratosis on right temple, right jaw and right parietal scalp  LN2:  Treated with LN2 for 5s for 1-2 cycles. Warned risks of blistering, pain, pigment change, scarring, and incomplete resolution.  Advised patient to return if lesions do not completely resolve.  Wound care sheet given.  4. History of NMSC   Signs and Symptoms of skin cancer discussed with patient.  ABCDEs of melanoma reviewed with patient.  Patient encouraged to perform monthly skin exams.  UV precautions reviewed with patient.  Risks of non-melanoma skin cancer discussed with patient   Return to clinic pending biopsy results.

## 2021-11-18 NOTE — LETTER
11/18/2021         RE: Rubén Lane  [address] [facility] [id number]        Dear Colleague,    Thank you for referring your patient, Rubén Lane, to the [facility]. Please see a copy of my visit note below.    Rubén Lane is an extremely pleasant 88 year old year old male patient here today for spot on left lower lip. He notes it was worse a few weeks ago but now getting smaller in size.  Patient has no other skin complaints today.  Remainder of the HPI, Meds, PMH, Allergies, FH, and SH was reviewed in chart.    Pertinent Hx:   History of NMSC  No past medical history on file.    No past surgical history on file.     No family history on file.    Social History     Socioeconomic History     Marital status:      Spouse name: Not on file     Number of children: Not on file     Years of education: Not on file     Highest education level: Not on file   Occupational History     Not on file   Tobacco Use     Smoking status: Former Smoker     Smokeless tobacco: Never Used   Substance and Sexual Activity     Alcohol use: Not on file     Drug use: Not on file     Sexual activity: Not on file   Other Topics Concern     Not on file   Social History Narrative     Not on file     Social Determinants of Health     Financial Resource Strain: Not on file   Food Insecurity: Not on file   Transportation Needs: Not on file   Physical Activity: Not on file   Stress: Not on file   Social Connections: Not on file   Intimate Partner Violence: Not on file   Housing Stability: Not on file       Outpatient Encounter Medications as of 11/18/2021   Medication Sig Dispense Refill     aspirin (ASPIRIN ADULT LOW DOSE) 81 MG EC tablet Take 81 mg by mouth daily       Ferrous Gluconate-C-Folic Acid (IRON-C PO) Take 65 mg by mouth       FLUoxetine (PROZAC) 20 MG capsule Take 40 mg by mouth daily       lisinopril (ZESTRIL) 40 MG tablet Take 40 mg by mouth daily       metoprolol tartrate (LOPRESSOR) 25 MG tablet  Take 25 mg by mouth 2 times daily       rosuvastatin (CRESTOR) 40 MG tablet Take 40 mg by mouth daily       sotalol (BETAPACE) 80 MG tablet Take 80 mg by mouth daily       acetaminophen (TYLENOL) 500 MG tablet Take 1,000 mg by mouth 3 times daily (Patient not taking: Reported on 9/20/2021)       albuterol (PROAIR HFA/PROVENTIL HFA/VENTOLIN HFA) 108 (90 Base) MCG/ACT inhaler Inhale 2 puffs into the lungs 4 times daily (Patient not taking: Reported on 9/20/2021)       apixaban ANTICOAGULANT (ELIQUIS) 5 MG tablet Take 5 mg by mouth 2 times daily (Patient not taking: Reported on 9/20/2021)       atorvastatin (LIPITOR) 80 MG tablet Take 80 mg by mouth every evening       furosemide (LASIX) 20 MG tablet One daily til med gone 30 tablet 0     nitroGLYcerin (NITROSTAT) 0.4 MG sublingual tablet Place 0.4 mg under the tongue every 5 minutes as needed for chest pain For chest pain place 1 tablet under the tongue every 5 minutes for 3 doses. If symptoms persist 5 minutes after 1st dose call 911. (Patient not taking: Reported on 9/20/2021)       rivaroxaban ANTICOAGULANT (XARELTO) 20 MG TABS tablet Take 20 mg by mouth daily (with dinner)       No facility-administered encounter medications on file as of 11/18/2021.             O:   NAD, WDWN, Alert & Oriented, Mood & Affect wnl, Vitals stable   Here today alone   /68   Pulse 65   SpO2 96%    General appearance normal   Vitals stable   Alert, oriented and in no acute distress     0.8 cm pink scaly papule on right superior helix   0.7 cm scaly papule on left lower lip   Gritty papule on nose x 1, left jaw x 2  Stuck on papules and brown macules on trunk and ext   Red papules on trunk  Brown papules and macules with regular pigment network and borders on torso and extremities      The remainder of skin exam is normal       Eyes: Conjunctivae/lids:Normal     ENT: Lips: normal    MSK:Normal    Pulm: Breathing Normal    Lymph Nodes: No Head and Neck Lymphadenopathy      Neuro/Psych: Orientation:Alert and Orientedx3 ; Mood/Affect:normal   A/P:  1. R/O SCC vs hypertrophic ak on right helix   TANGENTIAL BIOPSY SENT OUT:  After consent, anesthesia with LEC and prep, tangential excision performed and specimen sent out for permanent section histology.  No complications and routine wound care. Patient told to call our office in 1-2 weeks for result.      2. R/O inflamed seborrheic keratosis on left lower cutaneous lip   LN2:  Treated with LN2 for 5s for 1-2 cycles. Warned risks of blistering, pain, pigment change, scarring, and incomplete resolution.  Advised patient to return if lesions do not completely resolve.  Wound care sheet given.  3. Actinic keratosis on left jaw and nose x 3  LN2:  Treated with LN2 for 5s for 1-2 cycles. Warned risks of blistering, pain, pigment change, scarring, and incomplete resolution.  Advised patient to return if lesions do not completely resolve.  Wound care sheet given.  3. Inflamed seborrheic keratosis on right temple, right jaw and right parietal scalp  LN2:  Treated with LN2 for 5s for 1-2 cycles. Warned risks of blistering, pain, pigment change, scarring, and incomplete resolution.  Advised patient to return if lesions do not completely resolve.  Wound care sheet given.  4. History of NMSC   Signs and Symptoms of skin cancer discussed with patient.  ABCDEs of melanoma reviewed with patient.  Patient encouraged to perform monthly skin exams.  UV precautions reviewed with patient.  Risks of non-melanoma skin cancer discussed with patient   Return to clinic pending biopsy results.         Again, thank you for allowing me to participate in the care of your patient.        Sincerely,        Jayne Loomis PA-C

## 2021-11-22 LAB
PATH REPORT.COMMENTS IMP SPEC: ABNORMAL
PATH REPORT.COMMENTS IMP SPEC: ABNORMAL
PATH REPORT.COMMENTS IMP SPEC: YES
PATH REPORT.FINAL DX SPEC: ABNORMAL
PATH REPORT.GROSS SPEC: ABNORMAL
PATH REPORT.MICROSCOPIC SPEC OTHER STN: ABNORMAL
PATH REPORT.RELEVANT HX SPEC: ABNORMAL

## 2021-12-10 DIAGNOSIS — F32.A DEPRESSION, UNSPECIFIED DEPRESSION TYPE: Primary | ICD-10-CM

## 2021-12-13 NOTE — PROGRESS NOTES
Surgical Office Location :   [facility]  [address] [place] [address]

## 2021-12-14 ENCOUNTER — OFFICE VISIT (OUTPATIENT)
Dept: DERMATOLOGY | Facility: CLINIC | Age: 86
End: 2021-12-14
Payer: COMMERCIAL

## 2021-12-14 VITALS — HEART RATE: 74 BPM | DIASTOLIC BLOOD PRESSURE: 56 MMHG | SYSTOLIC BLOOD PRESSURE: 96 MMHG | OXYGEN SATURATION: 98 %

## 2021-12-14 DIAGNOSIS — C44.321 SQUAMOUS CELL CANCER OF SKIN OF NOSE: ICD-10-CM

## 2021-12-14 DIAGNOSIS — C44.222 SQUAMOUS CELL CANCER OF SKIN OF HELIX IN RIGHT EAR: Primary | ICD-10-CM

## 2021-12-14 DIAGNOSIS — L82.1 SEBORRHEIC KERATOSIS: ICD-10-CM

## 2021-12-14 DIAGNOSIS — L81.4 LENTIGO: ICD-10-CM

## 2021-12-14 DIAGNOSIS — Z85.828 HISTORY OF SKIN CANCER: ICD-10-CM

## 2021-12-14 PROCEDURE — 99212 OFFICE O/P EST SF 10 MIN: CPT | Mod: 25 | Performed by: DERMATOLOGY

## 2021-12-14 PROCEDURE — 88331 PATH CONSLTJ SURG 1 BLK 1SPC: CPT | Mod: 59 | Performed by: DERMATOLOGY

## 2021-12-14 PROCEDURE — 11102 TANGNTL BX SKIN SINGLE LES: CPT | Mod: 59 | Performed by: DERMATOLOGY

## 2021-12-14 PROCEDURE — 12011 RPR F/E/E/N/L/M 2.5 CM/<: CPT | Mod: 59 | Performed by: DERMATOLOGY

## 2021-12-14 PROCEDURE — 17311 MOHS 1 STAGE H/N/HF/G: CPT | Performed by: DERMATOLOGY

## 2021-12-14 NOTE — PATIENT INSTRUCTIONS
Open Wound Care     for ___Right Ear________    ? No strenuous activity for 48 hours    ? Take Tylenol as needed for discomfort.                                                .         ? Do not drink alcoholic beverages for 48 hours.    ? Keep the pressure bandage in place for 24 hours. If the bandage becomes blood tinged or loose, reinforce it with gauze and tape.        (Refer to the reverse side of this page for management of bleeding).    ? Remove bandage in 24 hours and begin wound care as follows:     1. Clean area with tap water using a Q tip or gauze pad, (shower / bathe normally)  2. Dry wound with Q tip or gauze pad  3. Apply Aquaphor, Vaseline, Polysporin or Bacitracin Ointment with a Q tip    Do NOT use Neosporin Ointment *  4. Cover the wound with a band-aid or nonstick gauze pad and paper tape.  5. Repeat wound care once a day until wound is completely healed.    It is an old wives tale that a wound heals better when it is exposed to air and allowed to dry out. The wound will heal faster with a better cosmetic result if it is kept moist with ointment and covered with a bandage.  Do not let the wound dry out.      Supplies Needed:                Qtips or gauze pads                Polysporin or Bacitracin Ointment                Bandaids or nonstick gauze pads and paper tape    Wound care kits and brown paper tape are available for purchase at   the pharmacy.    BLEEDIN. Use tightly rolled up gauze or cloth to apply direct pressure over the bandage for 20   minutes.  2. Reapply pressure for an additional 20 minutes if necessary  3. Call the office or go to the nearest emergency room if pressure fails to stop the bleeding.  4. Use additional gauze and tape to maintain pressure once the bleeding has stopped.  5. Begin wound care 24 hours after surgery as directed.                  WOUND HEALING    1. One week after surgery a pink / red halo will form around the outside of the wound.   This is new  skin.  2. The center of the wound will appear yellowish white and produce some drainage.  3. The pink halo will slowly migrate in toward the center of the wound until the wound is covered with new shiny pink skin.  4. There will be no more drainage when the wound is completely healed.  5. It will take six months to one year for the redness to fade.  6. The scar may be itchy, tight and sensitive to extreme temperatures for a year after the surgery.  7. Massaging the area several times a day for several minutes after the wound is completely healed will help the scar soften and normalize faster. Begin massage only after healing is complete.      In case of emergency call: Dr Keller: [phone]  [facility]: [phone]  [facility]:[phone]          Wound Care Instructions     FOR SUPERFICIAL WOUNDS     [facility] [phone]    [facility] [phone]    Nose    AFTER 24 HOURS YOU SHOULD REMOVE THE BANDAGE AND BEGIN DAILY DRESSING CHANGES AS FOLLOWS:     1) Remove Dressing.     2) Clean and dry the area with tap water using a Q-tip or sterile gauze pad.     3) Apply Vaseline, Aquaphor, Polysporin ointment or Bacitracin ointment over entire wound.  Do NOT use Neosporin ointment.     4) Cover the wound with a band-aid, or a sterile non-stick gauze pad and micropore paper tape      REPEAT THESE INSTRUCTIONS AT LEAST ONCE A DAY UNTIL THE WOUND HAS COMPLETELY HEALED.    It is an old wives tale that a wound heals better when it is exposed to air and allowed to dry out. The wound will heal faster with a better cosmetic result if it is kept moist with ointment and covered with a bandage.    **Do not let the wound dry out.**      Supplies Needed:      *Cotton tipped applicators (Q-tips)    *Polysporin Ointment or Bacitracin Ointment (NOT NEOSPORIN)    *Band-aids or non-stick gauze pads and micropore paper tape.      PATIENT INFORMATION:    During the healing process you  will notice a number of changes. All wounds develop a small halo of redness surrounding the wound.  This means healing is occurring. Severe itching with extensive redness usually indicates sensitivity to the ointment or bandage tape used to dress the wound.  You should call our office if this develops.      Swelling  and/or discoloration around your surgical site is common, particularly when performed around the eye.    All wounds normally drain.  The larger the wound the more drainage there will be.  After 7-10 days, you will notice the wound beginning to shrink and new skin will begin to grow.  The wound is healed when you can see skin has formed over the entire area.  A healed wound has a healthy, shiny look to the surface and is red to dark pink in color to normalize.  Wounds may take approximately 4-6 weeks to heal.  Larger wounds may take 6-8 weeks.  After the wound is healed you may discontinue dressing changes.    You may experience a sensation of tightness as your wound heals. This is normal and will gradually subside.    Your healed wound may be sensitive to temperature changes. This sensitivity improves with time, but if you re having a lot of discomfort, try to avoid temperature extremes.    Patients frequently experience itching after their wound appears to have healed because of the continue healing under the skin.  Plain Vaseline will help relieve the itching.        POSSIBLE COMPLICATIONS    BLEEDIN. Leave the bandage in place.  7. Use tightly rolled up gauze or a cloth to apply direct pressure over the bandage for 30  minutes.  8. Reapply pressure for an additional 30 minutes if necessary  9. Use additional gauze and tape to maintain pressure once the bleeding has stopped.

## 2021-12-14 NOTE — PROGRESS NOTES
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  Today he notes bleeding spot on nose.   .   Patient states this has been present for a while.  Patient reports the following symptoms:  Bleeding .  Patient reports the following previous treatments cryo. These treatments did not work.  Patient reports the following modifying factors none.  Associated symptoms: none.  Patient has no other skin complaints today.  Remainder of the HPI, Meds, PMH, Allergies, FH, and SH was reviewed in chart.    Pertinent Hx:   Non-melanoma skin cancer   Past Medical History:   Diagnosis Date     Squamous cell carcinoma of skin, unspecified        History reviewed. No pertinent surgical history.     History reviewed. No pertinent family history.    Social History     Socioeconomic History     Marital status:      Spouse name: Not on file     Number of children: Not on file     Years of education: Not on file     Highest education level: Not on file   Occupational History     Not on file   Tobacco Use     Smoking status: Former Smoker     Smokeless tobacco: Never Used   Substance and Sexual Activity     Alcohol use: Not on file     Drug use: Not on file     Sexual activity: Not on file   Other Topics Concern     Not on file   Social History Narrative     Not on file     Social Determinants of Health     Financial Resource Strain: Not on file   Food Insecurity: Not on file   Transportation Needs: Not on file   Physical Activity: Not on file   Stress: Not on file   Social Connections: Not on file   Intimate Partner Violence: Not on file   Housing Stability: Not on file       Outpatient Encounter Medications as of 12/14/2021   Medication Sig Dispense Refill     acetaminophen (TYLENOL) 500 MG tablet Take 1,000 mg by mouth 3 times daily (Patient not taking: Reported on 9/20/2021)       albuterol (PROAIR HFA/PROVENTIL HFA/VENTOLIN HFA)  108 (90 Base) MCG/ACT inhaler Inhale 2 puffs into the lungs 4 times daily (Patient not taking: Reported on 9/20/2021)       apixaban ANTICOAGULANT (ELIQUIS) 5 MG tablet Take 5 mg by mouth 2 times daily (Patient not taking: Reported on 9/20/2021)       aspirin (ASPIRIN ADULT LOW DOSE) 81 MG EC tablet Take 81 mg by mouth daily       atorvastatin (LIPITOR) 80 MG tablet Take 80 mg by mouth every evening       Ferrous Gluconate-C-Folic Acid (IRON-C PO) Take 65 mg by mouth       FLUoxetine (PROZAC) 20 MG capsule Take 2 capsules (40 mg) by mouth daily 30 capsule 0     furosemide (LASIX) 20 MG tablet One daily til med gone 30 tablet 0     lisinopril (ZESTRIL) 40 MG tablet Take 40 mg by mouth daily       metoprolol tartrate (LOPRESSOR) 25 MG tablet Take 25 mg by mouth 2 times daily       nitroGLYcerin (NITROSTAT) 0.4 MG sublingual tablet Place 0.4 mg under the tongue every 5 minutes as needed for chest pain For chest pain place 1 tablet under the tongue every 5 minutes for 3 doses. If symptoms persist 5 minutes after 1st dose call 911. (Patient not taking: Reported on 9/20/2021)       rivaroxaban ANTICOAGULANT (XARELTO) 20 MG TABS tablet Take 20 mg by mouth daily (with dinner)       rosuvastatin (CRESTOR) 40 MG tablet Take 40 mg by mouth daily       sotalol (BETAPACE) 80 MG tablet Take 80 mg by mouth daily       No facility-administered encounter medications on file as of 12/14/2021.             O:   NAD, WDWN, Alert & Oriented, Mood & Affect wnl, Vitals stable   Here today alone   BP 96/56   Pulse 74   SpO2 98%    General appearance normal   Vitals stable   Alert, oriented and in no acute distress      Following lymph nodes palpated: Occipital, Cervical, Supraclavicular no lad  R helix 8mm red scaly papule   R nasal tip bleeding 3mm scaly papule   Stuck on papules and brown macules on trunk and ext       Eyes: Conjunctivae/lids:Normal     ENT: Lips, buccal mucosa, tongue: normal    MSK:Normal    Cardiovascular: peripheral  edema none    Pulm: Breathing Normal    Lymph Nodes: No Head and Neck Lymphadenopathy     Neuro/Psych: Orientation:Alert and Orientedx3 ; Mood/Affect:normal       MICRO:   R nasal tip:There is hyperkeratosis & parakeratosis of the epidermis, with full thickness epidermal involvement by atypical keratinocytes with rare pale vacuolated cells invading dermis.    A/P:  1. R nasal tip r/o squamous cell carcinoma   TANGENTIAL BIOPSY IN HOUSE:  After consent, anesthesia with LEC and prep, tangential excision performed and dx above confirmed with frozen section histology.  No complications and routine wound care.  Patient is on  anticoagulants and risk of bleeding discussed with patient.       I have personally reviewed all specimens and/or slides and used them with my medical judgement to determine or confirm the final diagnosis.     Patient told result squamous cell carcinoma   2. Seborrheic keratosis, lentigo, hx of non-melanoma skin cancer   3. R helix squamous cell carcinoma     It was a pleasure speaking to Rubén Lane today.  Previous clinic notes and pertinent laboratory tests were reviewed prior to Rubén Lane's visit.  Nature and genetics of benign skin lesions dicussed with patient.  Signs and Symptoms of skin cancer discussed with patient.  Patient encouraged to perform monthly skin exams.  UV precautions reviewed with patient.  Risks of non-melanoma skin cancer discussed with patient   Return to clinic next appt  PROCEDURE NOTE  R helix squamous cell carcinoma   MOHS:   Location    The rationale for Mohs surgery was discussed with the patient and consent was obtained.  The risks and benefits as well as alternatives to therapy were discussed, in detail.  Specifically, the risks of infection, scarring, bleeding, prolonged wound healing, incomplete removal, allergy to anesthesia, nerve injury and recurrence were addressed.  Indication for Mohs was Location. Prior to the procedure, the treatment site was clearly  identified and, if available, confirmed with previous photos and confirmed by the patient   All components of the Universal Protocol/PAUSE rule were completed.  The Mohs surgeon operated in two distinct and integrated capacities as the surgeon and pathologist.      The area was prepped with Betasept.  A rim of normal appearing skin was marked circumferentially around the lesion.  The area was infiltrated with local anesthesia.  The tumor was first debulked to remove all clinically apparent tumor.  An incision following the standard Mohs approach was done and the specimen was oriented,mapped and placed in 1 block(s).  Each specimen was then chromacoded and processed in the Mohs laboratory using standard Mohs technique and submitted for frozen section histology.  Frozen section analysis showed no residual tumor but CLEAR MARGINS.      The tumor was excised using standard Mohs technique in 1 stages(s).  CLEAR MARGINS OBTAINED and Final defect size was 1.4 x 1.2 cm.     We discussed the options for wound management in full with the patient including risks/benefits/ possible outcomes.        REPAIR SECOND INTENT: We discussed the options for wound management in full with the patient including risks/benefits/possible outcomes. Decision made to allow the wound to heal by second intention. Cautery was used for for hemostasis. EBL minimal; complications none; wound care routine.  The patient was discharged in good condition and will return in one month or prn for wound evaluation.

## 2021-12-14 NOTE — LETTER
12/14/2021         RE: Rubén Lane  [address] [facility] [address]        Dear Colleague,    Thank you for referring your patient, Rubén Lane, to the [facility]. Please see a copy of my visit note below.    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  Today he notes bleeding spot on nose.   .   Patient states this has been present for a while.  Patient reports the following symptoms:  Bleeding .  Patient reports the following previous treatments cryo. These treatments did not work.  Patient reports the following modifying factors none.  Associated symptoms: none.  Patient has no other skin complaints today.  Remainder of the HPI, Meds, PMH, Allergies, FH, and SH was reviewed in chart.    Pertinent Hx:   Non-melanoma skin cancer   Past Medical History:   Diagnosis Date     Squamous cell carcinoma of skin, unspecified        History reviewed. No pertinent surgical history.     History reviewed. No pertinent family history.    Social History     Socioeconomic History     Marital status:      Spouse name: Not on file     Number of children: Not on file     Years of education: Not on file     Highest education level: Not on file   Occupational History     Not on file   Tobacco Use     Smoking status: Former Smoker     Smokeless tobacco: Never Used   Substance and Sexual Activity     Alcohol use: Not on file     Drug use: Not on file     Sexual activity: Not on file   Other Topics Concern     Not on file   Social History Narrative     Not on file     Social Determinants of Health     Financial Resource Strain: Not on file   Food Insecurity: Not on file   Transportation Needs: Not on file   Physical Activity: Not on file   Stress: Not on file   Social Connections: Not on  file   Intimate Partner Violence: Not on file   Housing Stability: Not on file       Outpatient Encounter Medications as of 12/14/2021   Medication Sig Dispense Refill     acetaminophen (TYLENOL) 500 MG tablet Take 1,000 mg by mouth 3 times daily (Patient not taking: Reported on 9/20/2021)       albuterol (PROAIR HFA/PROVENTIL HFA/VENTOLIN HFA) 108 (90 Base) MCG/ACT inhaler Inhale 2 puffs into the lungs 4 times daily (Patient not taking: Reported on 9/20/2021)       apixaban ANTICOAGULANT (ELIQUIS) 5 MG tablet Take 5 mg by mouth 2 times daily (Patient not taking: Reported on 9/20/2021)       aspirin (ASPIRIN ADULT LOW DOSE) 81 MG EC tablet Take 81 mg by mouth daily       atorvastatin (LIPITOR) 80 MG tablet Take 80 mg by mouth every evening       Ferrous Gluconate-C-Folic Acid (IRON-C PO) Take 65 mg by mouth       FLUoxetine (PROZAC) 20 MG capsule Take 2 capsules (40 mg) by mouth daily 30 capsule 0     furosemide (LASIX) 20 MG tablet One daily til med gone 30 tablet 0     lisinopril (ZESTRIL) 40 MG tablet Take 40 mg by mouth daily       metoprolol tartrate (LOPRESSOR) 25 MG tablet Take 25 mg by mouth 2 times daily       nitroGLYcerin (NITROSTAT) 0.4 MG sublingual tablet Place 0.4 mg under the tongue every 5 minutes as needed for chest pain For chest pain place 1 tablet under the tongue every 5 minutes for 3 doses. If symptoms persist 5 minutes after 1st dose call 911. (Patient not taking: Reported on 9/20/2021)       rivaroxaban ANTICOAGULANT (XARELTO) 20 MG TABS tablet Take 20 mg by mouth daily (with dinner)       rosuvastatin (CRESTOR) 40 MG tablet Take 40 mg by mouth daily       sotalol (BETAPACE) 80 MG tablet Take 80 mg by mouth daily       No facility-administered encounter medications on file as of 12/14/2021.             O:   NAD, WDWN, Alert & Oriented, Mood & Affect wnl, Vitals stable   Here today alone   BP 96/56   Pulse 74   SpO2 98%    General appearance normal   Vitals stable   Alert, oriented and in  no acute distress      Following lymph nodes palpated: Occipital, Cervical, Supraclavicular no lad  R helix 8mm red scaly papule   R nasal tip bleeding 3mm scaly papule   Stuck on papules and brown macules on trunk and ext       Eyes: Conjunctivae/lids:Normal     ENT: Lips, buccal mucosa, tongue: normal    MSK:Normal    Cardiovascular: peripheral edema none    Pulm: Breathing Normal    Lymph Nodes: No Head and Neck Lymphadenopathy     Neuro/Psych: Orientation:Alert and Orientedx3 ; Mood/Affect:normal       MICRO:   R nasal tip:There is hyperkeratosis & parakeratosis of the epidermis, with full thickness epidermal involvement by atypical keratinocytes with rare pale vacuolated cells invading dermis.    A/P:  1. R nasal tip r/o squamous cell carcinoma   TANGENTIAL BIOPSY IN HOUSE:  After consent, anesthesia with LEC and prep, tangential excision performed and dx above confirmed with frozen section histology.  No complications and routine wound care.  Patient is on  anticoagulants and risk of bleeding discussed with patient.       I have personally reviewed all specimens and/or slides and used them with my medical judgement to determine or confirm the final diagnosis.     Patient told result squamous cell carcinoma   2. Seborrheic keratosis, lentigo, hx of non-melanoma skin cancer   3. R helix squamous cell carcinoma     It was a pleasure speaking to Rubén Lane today.  Previous clinic notes and pertinent laboratory tests were reviewed prior to Rubén Lane's visit.  Nature and genetics of benign skin lesions dicussed with patient.  Signs and Symptoms of skin cancer discussed with patient.  Patient encouraged to perform monthly skin exams.  UV precautions reviewed with patient.  Risks of non-melanoma skin cancer discussed with patient   Return to clinic next appt  PROCEDURE NOTE  R helix squamous cell carcinoma   MOHS:   Location    The rationale for Mohs surgery was discussed with the patient and consent was obtained.   The risks and benefits as well as alternatives to therapy were discussed, in detail.  Specifically, the risks of infection, scarring, bleeding, prolonged wound healing, incomplete removal, allergy to anesthesia, nerve injury and recurrence were addressed.  Indication for Mohs was Location. Prior to the procedure, the treatment site was clearly identified and, if available, confirmed with previous photos and confirmed by the patient   All components of the Universal Protocol/PAUSE rule were completed.  The Mohs surgeon operated in two distinct and integrated capacities as the surgeon and pathologist.      The area was prepped with Betasept.  A rim of normal appearing skin was marked circumferentially around the lesion.  The area was infiltrated with local anesthesia.  The tumor was first debulked to remove all clinically apparent tumor.  An incision following the standard Mohs approach was done and the specimen was oriented,mapped and placed in 1 block(s).  Each specimen was then chromacoded and processed in the Mohs laboratory using standard Mohs technique and submitted for frozen section histology.  Frozen section analysis showed no residual tumor but CLEAR MARGINS.      The tumor was excised using standard Mohs technique in 1 stages(s).  CLEAR MARGINS OBTAINED and Final defect size was 1.4 x 1.2 cm.     We discussed the options for wound management in full with the patient including risks/benefits/ possible outcomes.        REPAIR SECOND INTENT: We discussed the options for wound management in full with the patient including risks/benefits/possible outcomes. Decision made to allow the wound to heal by second intention. Cautery was used for for hemostasis. EBL minimal; complications none; wound care routine.  The patient was discharged in good condition and will return in one month or prn for wound evaluation.        Again, thank you for allowing me to participate in the care of your patient.         Sincerely,        Ernie Keller MD

## 2021-12-20 NOTE — PROGRESS NOTES
Surgical Office Location :   [facility]  [address] [place] [address]

## 2021-12-21 ENCOUNTER — OFFICE VISIT (OUTPATIENT)
Dept: DERMATOLOGY | Facility: CLINIC | Age: 86
End: 2021-12-21
Payer: COMMERCIAL

## 2021-12-21 VITALS — SYSTOLIC BLOOD PRESSURE: 104 MMHG | OXYGEN SATURATION: 98 % | DIASTOLIC BLOOD PRESSURE: 68 MMHG | HEART RATE: 82 BPM

## 2021-12-21 DIAGNOSIS — C44.02 SQUAMOUS CELL CANCER OF LIP: Primary | ICD-10-CM

## 2021-12-21 PROCEDURE — 17311 MOHS 1 STAGE H/N/HF/G: CPT | Performed by: DERMATOLOGY

## 2021-12-21 PROCEDURE — 13152 CMPLX RPR E/N/E/L 2.6-7.5 CM: CPT | Performed by: DERMATOLOGY

## 2021-12-21 PROCEDURE — 99207 PR NO CHARGE LOS: CPT | Performed by: DERMATOLOGY

## 2021-12-21 NOTE — PATIENT INSTRUCTIONS
Sutured Wound Care Instructions  For Lips or Chin     ? No strenuous activity for 48 hours. Resume moderate activity in 48 hours. No heavy exercising until you are seen for follow up in one week.     ? Take Tylenol as needed for discomfort.                         ? Do not drink alcoholic beverages for 48 hours.     ? Keep the pressure bandage in place for 24 hours. If the bandage becomes blood tinged or loose, reinforce it with gauze and tape.       (Refer to the reverse side of this page for management of bleeding).    ? Remove bandage in 24 hours (white)    ? NO STRAWS or puckering motion  for 2 weeks    ? Leave the flat bandage in place until your follow up appointment. (brown)    ? Keep the bandage dry. Wash around it carefully.    ? If the tape becomes soiled or starts to come off, reinforce it with additional paper tape.    ? Do not smoke for 3 weeks; smoking is detrimental to wound healing.    ? It is normal to have swelling and bruising around the surgical site. The bruising will fade in approximately 10-14 days. Elevate the area to reduce swelling.    ? Try to keep your lips / chin as immobile as possible. Refrain from laughing, smiling and yawning for 3 weeks.    ? Eat soft foods for the first 24 hours and take small bites of food for the entire three weeks.    ? When brushing your teeth, you should use a child s toothbrush or use mouth wash to prevent stretching of surgery site.    ? Numbness, itchiness and sensitivity to temperature changes can occur after surgery and may take up to 18 months to normalize.    POSSIBLE COMPLICATIONS    BLEEDIN. Leave the bandage in place.  2. Use tightly rolled up gauze or a cloth to apply direct pressure over the bandage for 20   minutes.  3. Reapply pressure for an additional 20 minutes if necessary  4. Call the office or go to the nearest emergency room if pressure fails to stop the bleeding.  5. Use additional gauze and tape to maintain pressure once the  bleeding has stopped.    PAIN:    1. Post operative pain should slowly get better, never worse.  2. A severe increase in pain may indicate a problem. Call the office if this occurs.    ONE WEEK AFTER SURGERY:  -Remove bandage 12/28/21  -Clean and dry the area with plain tap water using a Q-tip or sterile gauze pad  -Reapply steri strips down incision and cover with paper tape  -Leave bandage in place for 1  week  -Remove bandage on 01/04/22    WOUND CARE INSTRUCTIONS  for  ONE WEEK AFTER SURGERY    1) Leave flat bandage on your skin for one week after today s bandage change.  2) In one week when you remove the bandage, you may resume your regular skin care routine, including washing with mild soap and water, applying moisturizer, make-up and sunscreen.    3) If there are any open or bleeding areas at the incision/graft site you should begin to cover the area with a bandage daily as follows:    1) Clean and dry the area with plain tap water using a Q-tip or sterile gauze pad.  2) Apply Polysporin or Bacitracin ointment to the open area.  3) Cover the wound with a band-aid or a sterile non-stick gauze pad and micropore paper tape.     SIGNS OF INFECTION  - If you notice any of these signs of infection, call your doctor right away: expanding redness around the wound.  - Yellow or greenish-colored pus or cloudy wound drainage.    - Red streaking spreading from the wound.  - Increased swelling, tenderness, or pain around the wound.   - Fever.    Please remember that yellow and clear drainage from a wound can be normal and related to normal wound healing.  Isolated drainage from a wound without a combination of the above features does not indicate infection.     *Once the bandages are removed, the scar will be red and firm (especially in the lip/chin area). This is normal and will fade in time. It might take 6-12 months for this to happen.     *Massaging the area will help the scar soften and fade quicker. Begin to  massage the area one month after the bandages have been removed. To massage apply pressure directly and firmly over the scar with the fingertips and move in a circular motion. Massage the area for a few minutes several times a day. Continue to massage the site for several months.    *Approximately 6-8 weeks after surgery it is not uncommon to see the formation of  tender pimple-like  bump along the scar. This is normal. As the scar continues to mature and the stitches underneath the skin begin to dissolve, this might occur. Do not pick or squeeze, this will resolve on it s own. Should one break open producing a small amount of drainage, apply Polysporin or Bacitracin ointment a few times a day until the wound is completely healed.    *Numbness in the surgical area is expected. It might take 12-18 months for the feeling to return to normal. During this time sensations of itchiness, tingling and occasional sharp pains might be noted. These feelings are normal and will subside once the nerves have completely healed.     IN CASE OF EMERGENCY: Dr Keller [phone]     If you were seen in [place] call: [phone]    If you were seen in [place] call: [phone]

## 2021-12-21 NOTE — NURSING NOTE
"Initial /68 (BP Location: Left arm)   Pulse 82   SpO2 98%  Estimated body mass index is 27.37 kg/m  as calculated from the following:    Height as of 9/23/21: 1.727 m (5' 8\").    Weight as of 9/23/21: 81.6 kg (180 lb). .      "

## 2021-12-21 NOTE — LETTER
12/21/2021         RE: Rubén Lane  [address] [facility] [address]        Dear Colleague,    Thank you for referring your patient, Rubén Lane, to the [facility]. Please see a copy of my visit note below.    Surgical Office Location :   [facility]  [address] [place] [address]      Rubén Lane is an extremely pleasant 88 year old year old male patient here today for evaluation and managment of squamous cell carcinoma on left lower cut lip.  Previous site healing well. Patient has no other skin complaints today.  Remainder of the HPI, Meds, PMH, Allergies, FH, and SH was reviewed in chart.      Past Medical History:   Diagnosis Date     Squamous cell carcinoma of skin, unspecified        History reviewed. No pertinent surgical history.     History reviewed. No pertinent family history.    Social History     Socioeconomic History     Marital status:      Spouse name: Not on file     Number of children: Not on file     Years of education: Not on file     Highest education level: Not on file   Occupational History     Not on file   Tobacco Use     Smoking status: Former Smoker     Smokeless tobacco: Never Used   Substance and Sexual Activity     Alcohol use: Not on file     Drug use: Not on file     Sexual activity: Not on file   Other Topics Concern     Not on file   Social History Narrative     Not on file     Social Determinants of Health     Financial Resource Strain: Not on file   Food Insecurity: Not on file   Transportation Needs: Not on file   Physical Activity: Not on file   Stress: Not on file   Social Connections: Not on file   Intimate Partner Violence: Not on file   Housing Stability: Not on file       Outpatient Encounter Medications as of 12/21/2021   Medication Sig Dispense Refill     aspirin (ASPIRIN ADULT LOW DOSE) 81 MG EC tablet Take 81 mg by mouth daily       atorvastatin (LIPITOR) 80 MG tablet Take 80 mg by mouth every evening        Ferrous Gluconate-C-Folic Acid (IRON-C PO) Take 65 mg by mouth       FLUoxetine (PROZAC) 20 MG capsule Take 2 capsules (40 mg) by mouth daily 30 capsule 0     furosemide (LASIX) 20 MG tablet One daily til med gone 30 tablet 0     lisinopril (ZESTRIL) 40 MG tablet Take 40 mg by mouth daily       metoprolol tartrate (LOPRESSOR) 25 MG tablet Take 25 mg by mouth 2 times daily       rivaroxaban ANTICOAGULANT (XARELTO) 20 MG TABS tablet Take 20 mg by mouth daily (with dinner)       rosuvastatin (CRESTOR) 40 MG tablet Take 40 mg by mouth daily       sotalol (BETAPACE) 80 MG tablet Take 80 mg by mouth daily       acetaminophen (TYLENOL) 500 MG tablet Take 1,000 mg by mouth 3 times daily (Patient not taking: Reported on 9/20/2021)       albuterol (PROAIR HFA/PROVENTIL HFA/VENTOLIN HFA) 108 (90 Base) MCG/ACT inhaler Inhale 2 puffs into the lungs 4 times daily (Patient not taking: Reported on 9/20/2021)       apixaban ANTICOAGULANT (ELIQUIS) 5 MG tablet Take 5 mg by mouth 2 times daily (Patient not taking: Reported on 9/20/2021)       nitroGLYcerin (NITROSTAT) 0.4 MG sublingual tablet Place 0.4 mg under the tongue every 5 minutes as needed for chest pain For chest pain place 1 tablet under the tongue every 5 minutes for 3 doses. If symptoms persist 5 minutes after 1st dose call 911. (Patient not taking: Reported on 9/20/2021)       No facility-administered encounter medications on file as of 12/21/2021.             O:   NAD, WDWN, Alert & Oriented, Mood & Affect wnl, Vitals stable   Here today alone   /68 (BP Location: Left arm)   Pulse 82   SpO2 98%    General appearance normal   Vitals stable   Alert, oriented and in no acute distress      Following lymph nodes palpated: Occipital, Cervical, Supraclavicular no lad  L lower cut lip 7mm scaly papule       Eyes: Conjunctivae/lids:Normal     ENT: Lips, buccal mucosa, tongue: normal    MSK:Normal    Cardiovascular: peripheral edema none    Pulm: Breathing  Normal    Lymph Nodes: No Head and Neck Lymphadenopathy     Neuro/Psych: Orientation:Alert and Orientedx3 ; Mood/Affect:normal       A/P:  1.  l lower cut lip squamous cell carcinoma   MOHS:   Location    The rationale for Mohs surgery was discussed with the patient and consent was obtained.  The risks and benefits as well as alternatives to therapy were discussed, in detail.  Specifically, the risks of infection, scarring, bleeding, prolonged wound healing, incomplete removal, allergy to anesthesia, nerve injury and recurrence were addressed.  Indication for Mohs was Location. Prior to the procedure, the treatment site was clearly identified and, if available, confirmed with previous photos and confirmed by the patient   All components of the Universal Protocol/PAUSE rule were completed.  The Mohs surgeon operated in two distinct and integrated capacities as the surgeon and pathologist.      The area was prepped with Betasept.  A rim of normal appearing skin was marked circumferentially around the lesion.  The area was infiltrated with local anesthesia.  The tumor was first debulked to remove all clinically apparent tumor.  An incision following the standard Mohs approach was done and the specimen was oriented,mapped and placed in 1 block(s).  Each specimen was then chromacoded and processed in the Mohs laboratory using standard Mohs technique and submitted for frozen section histology.  Frozen section analysis showed  residual tumor but CLEAR MARGINS.    First stage:There is a proliferation of irregular nests of abnormal squamous cells arising from the epidermis and invading the dermis. These are well differentiated. The dermis shows a variable superficial perivascular inflammatory infiltrate.     The tumor was excised using standard Mohs technique in 1 stages(s).  CLEAR MARGINS OBTAINED and Final defect size was 1.1 x 1.3 cm.     We discussed the options for wound management in full with the patient including  risks/benefits/ possible outcomes.        REPAIR COMPLEX: Because of the tightness of the surrounding skin and Because of the size and full thickness nature of the defect and Because of the proximity to the vermilion, a complex closure was planned. After LEC anesthesia and prep, Burow's triangles were excised in the relaxed skin tension lines. The wound edges were widely undermined greater than width of the defect on both sides (1.1 cm) by dissection in the subcutaneous plane until adequate tissue mobility was obtained. Hemostasis was obtained. The wound edges were closed in a layered fashion using Vicryl and Fast Absorbing Plain Gut sutures. Postoperative length was 3.6 cm.   EBL minimal; complications none; wound care routine.  The patient was discharged in good condition and will return in one week for wound evaluation.  It was a pleasure speaking to Rubén Lane today.  Previous clinic notes and pertinent laboratory tests were reviewed prior to Rubén Lane's visit.  Nature and genetics of benign skin lesions dicussed with patient.  Signs and Symptoms of skin cancer discussed with patient.  Patient encouraged to perform monthly skin exams.  UV precautions reviewed with patient.  Risks of non-melanoma skin cancer discussed with patient   Return to clinic 3 months        Again, thank you for allowing me to participate in the care of your patient.        Sincerely,        Ernie Keller MD

## 2021-12-21 NOTE — PROGRESS NOTES
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  Previous site healing well. Patient has no other skin complaints today.  Remainder of the HPI, Meds, PMH, Allergies, FH, and SH was reviewed in chart.      Past Medical History:   Diagnosis Date     Squamous cell carcinoma of skin, unspecified        History reviewed. No pertinent surgical history.     History reviewed. No pertinent family history.    Social History     Socioeconomic History     Marital status:      Spouse name: Not on file     Number of children: Not on file     Years of education: Not on file     Highest education level: Not on file   Occupational History     Not on file   Tobacco Use     Smoking status: Former Smoker     Smokeless tobacco: Never Used   Substance and Sexual Activity     Alcohol use: Not on file     Drug use: Not on file     Sexual activity: Not on file   Other Topics Concern     Not on file   Social History Narrative     Not on file     Social Determinants of Health     Financial Resource Strain: Not on file   Food Insecurity: Not on file   Transportation Needs: Not on file   Physical Activity: Not on file   Stress: Not on file   Social Connections: Not on file   Intimate Partner Violence: Not on file   Housing Stability: Not on file       Outpatient Encounter Medications as of 12/21/2021   Medication Sig Dispense Refill     aspirin (ASPIRIN ADULT LOW DOSE) 81 MG EC tablet Take 81 mg by mouth daily       atorvastatin (LIPITOR) 80 MG tablet Take 80 mg by mouth every evening       Ferrous Gluconate-C-Folic Acid (IRON-C PO) Take 65 mg by mouth       FLUoxetine (PROZAC) 20 MG capsule Take 2 capsules (40 mg) by mouth daily 30 capsule 0     furosemide (LASIX) 20 MG tablet One daily til med gone 30 tablet 0     lisinopril (ZESTRIL) 40 MG tablet Take 40 mg by mouth daily       metoprolol tartrate (LOPRESSOR) 25 MG tablet  Take 25 mg by mouth 2 times daily       rivaroxaban ANTICOAGULANT (XARELTO) 20 MG TABS tablet Take 20 mg by mouth daily (with dinner)       rosuvastatin (CRESTOR) 40 MG tablet Take 40 mg by mouth daily       sotalol (BETAPACE) 80 MG tablet Take 80 mg by mouth daily       acetaminophen (TYLENOL) 500 MG tablet Take 1,000 mg by mouth 3 times daily (Patient not taking: Reported on 9/20/2021)       albuterol (PROAIR HFA/PROVENTIL HFA/VENTOLIN HFA) 108 (90 Base) MCG/ACT inhaler Inhale 2 puffs into the lungs 4 times daily (Patient not taking: Reported on 9/20/2021)       apixaban ANTICOAGULANT (ELIQUIS) 5 MG tablet Take 5 mg by mouth 2 times daily (Patient not taking: Reported on 9/20/2021)       nitroGLYcerin (NITROSTAT) 0.4 MG sublingual tablet Place 0.4 mg under the tongue every 5 minutes as needed for chest pain For chest pain place 1 tablet under the tongue every 5 minutes for 3 doses. If symptoms persist 5 minutes after 1st dose call 911. (Patient not taking: Reported on 9/20/2021)       No facility-administered encounter medications on file as of 12/21/2021.             O:   NAD, WDWN, Alert & Oriented, Mood & Affect wnl, Vitals stable   Here today alone   /68 (BP Location: Left arm)   Pulse 82   SpO2 98%    General appearance normal   Vitals stable   Alert, oriented and in no acute distress      Following lymph nodes palpated: Occipital, Cervical, Supraclavicular no lad  L lower cut lip 7mm scaly papule       Eyes: Conjunctivae/lids:Normal     ENT: Lips, buccal mucosa, tongue: normal    MSK:Normal    Cardiovascular: peripheral edema none    Pulm: Breathing Normal    Lymph Nodes: No Head and Neck Lymphadenopathy     Neuro/Psych: Orientation:Alert and Orientedx3 ; Mood/Affect:normal       A/P:  1.  l lower cut lip squamous cell carcinoma   MOHS:   Location    The rationale for Mohs surgery was discussed with the patient and consent was obtained.  The risks and benefits as well as alternatives to therapy were  discussed, in detail.  Specifically, the risks of infection, scarring, bleeding, prolonged wound healing, incomplete removal, allergy to anesthesia, nerve injury and recurrence were addressed.  Indication for Mohs was Location. Prior to the procedure, the treatment site was clearly identified and, if available, confirmed with previous photos and confirmed by the patient   All components of the Universal Protocol/PAUSE rule were completed.  The Mohs surgeon operated in two distinct and integrated capacities as the surgeon and pathologist.      The area was prepped with Betasept.  A rim of normal appearing skin was marked circumferentially around the lesion.  The area was infiltrated with local anesthesia.  The tumor was first debulked to remove all clinically apparent tumor.  An incision following the standard Mohs approach was done and the specimen was oriented,mapped and placed in 1 block(s).  Each specimen was then chromacoded and processed in the Mohs laboratory using standard Mohs technique and submitted for frozen section histology.  Frozen section analysis showed  residual tumor but CLEAR MARGINS.    First stage:There is a proliferation of irregular nests of abnormal squamous cells arising from the epidermis and invading the dermis. These are well differentiated. The dermis shows a variable superficial perivascular inflammatory infiltrate.     The tumor was excised using standard Mohs technique in 1 stages(s).  CLEAR MARGINS OBTAINED and Final defect size was 1.1 x 1.3 cm.     We discussed the options for wound management in full with the patient including risks/benefits/ possible outcomes.        REPAIR COMPLEX: Because of the tightness of the surrounding skin and Because of the size and full thickness nature of the defect and Because of the proximity to the vermilion, a complex closure was planned. After LEC anesthesia and prep, Burow's triangles were excised in the relaxed skin tension lines. The wound  edges were widely undermined greater than width of the defect on both sides (1.1 cm) by dissection in the subcutaneous plane until adequate tissue mobility was obtained. Hemostasis was obtained. The wound edges were closed in a layered fashion using Vicryl and Fast Absorbing Plain Gut sutures. Postoperative length was 3.6 cm.   EBL minimal; complications none; wound care routine.  The patient was discharged in good condition and will return in one week for wound evaluation.  It was a pleasure speaking to Rubén Lane today.  Previous clinic notes and pertinent laboratory tests were reviewed prior to Rubén Lane's visit.  Nature and genetics of benign skin lesions dicussed with patient.  Signs and Symptoms of skin cancer discussed with patient.  Patient encouraged to perform monthly skin exams.  UV precautions reviewed with patient.  Risks of non-melanoma skin cancer discussed with patient   Return to clinic 3 months

## 2022-01-04 ENCOUNTER — OFFICE VISIT (OUTPATIENT)
Dept: DERMATOLOGY | Facility: CLINIC | Age: 87
End: 2022-01-04
Payer: COMMERCIAL

## 2022-01-04 VITALS — DIASTOLIC BLOOD PRESSURE: 67 MMHG | SYSTOLIC BLOOD PRESSURE: 108 MMHG | OXYGEN SATURATION: 94 % | HEART RATE: 82 BPM

## 2022-01-04 DIAGNOSIS — C44.321 SQUAMOUS CELL CANCER OF SKIN OF NOSE: Primary | ICD-10-CM

## 2022-01-04 PROCEDURE — 17312 MOHS ADDL STAGE: CPT | Performed by: DERMATOLOGY

## 2022-01-04 PROCEDURE — 99207 PR NO CHARGE LOS: CPT | Performed by: DERMATOLOGY

## 2022-01-04 PROCEDURE — 17311 MOHS 1 STAGE H/N/HF/G: CPT | Performed by: DERMATOLOGY

## 2022-01-04 PROCEDURE — 13152 CMPLX RPR E/N/E/L 2.6-7.5 CM: CPT | Performed by: DERMATOLOGY

## 2022-01-04 NOTE — PATIENT INSTRUCTIONS
Sutured Wound Care     [facility]: [phone]    [facility]: [phone]      ? No strenuous activity for 48 hours. Resume moderate activity in 48 hours. No heavy exercising until you are seen for follow up in one week.     ? Take Tylenol as needed for discomfort.                         ? Do not drink alcoholic beverages for 48 hours.     ? Keep the pressure bandage in place for 24 hours. If the bandage becomes blood tinged or loose, reinforce it with gauze and tape.        (Refer to the reverse side of this page for management of bleeding).    ? Remove pressure bandage in 24 hours     ? Leave the flat brown bandage in place until your follow up appointment.    ? Keep the bandage dry. Wash around it carefully.    ? If the tape becomes soiled or starts to come off, reinforce it with additional paper tape.    ? Do not smoke for 3 weeks; smoking is detrimental to wound healing.    ? It is normal to have swelling and bruising around the surgical site. The bruising will fade in approximately 10-14 days. Elevate the area to reduce swelling.    ? Numbness, itchiness and sensitivity to temperature changes can occur after surgery and may take up to 18 months to normalize.      POSSIBLE COMPLICATIONS    BLEEDIN. Leave the bandage in place.  2. Use tightly rolled up gauze or a cloth to apply direct pressure over the bandage for 20   minutes.  3. Reapply pressure for an additional 20 minutes if necessary  4. Call the office or go to the nearest emergency room if pressure fails to stop the bleeding.  5. Use additional gauze and tape to maintain pressure once the bleeding has stopped.        PAIN:    1. Post operative pain should slowly get better, never worse.  2. A severe increase in pain may indicate a problem. Call the office if this occurs.    In case of emergency phone:Dr Keller [phone]

## 2022-01-04 NOTE — NURSING NOTE
Chief Complaint   Patient presents with     Derm Problem     Mohs Franci M. Mcphee on 1/4/2022 at 7:44 AM

## 2022-01-04 NOTE — PROGRESS NOTES
Rubén Lane is an extremely pleasant 88 year old year old male patient here today for evaluation and managment of squamous cell carcinoma on right nasal tip.  Patient has no other skin complaints today.  Remainder of the HPI, Meds, PMH, Allergies, FH, and SH was reviewed in chart.      Past Medical History:   Diagnosis Date     Squamous cell carcinoma of skin, unspecified        History reviewed. No pertinent surgical history.     History reviewed. No pertinent family history.    Social History     Socioeconomic History     Marital status:      Spouse name: Not on file     Number of children: Not on file     Years of education: Not on file     Highest education level: Not on file   Occupational History     Not on file   Tobacco Use     Smoking status: Former Smoker     Smokeless tobacco: Never Used   Substance and Sexual Activity     Alcohol use: Not on file     Drug use: Not on file     Sexual activity: Not on file   Other Topics Concern     Not on file   Social History Narrative     Not on file     Social Determinants of Health     Financial Resource Strain: Not on file   Food Insecurity: Not on file   Transportation Needs: Not on file   Physical Activity: Not on file   Stress: Not on file   Social Connections: Not on file   Intimate Partner Violence: Not on file   Housing Stability: Not on file       Outpatient Encounter Medications as of 1/4/2022   Medication Sig Dispense Refill     acetaminophen (TYLENOL) 500 MG tablet Take 1,000 mg by mouth 3 times daily        albuterol (PROAIR HFA/PROVENTIL HFA/VENTOLIN HFA) 108 (90 Base) MCG/ACT inhaler Inhale 2 puffs into the lungs 4 times daily        apixaban ANTICOAGULANT (ELIQUIS) 5 MG tablet Take 5 mg by mouth 2 times daily        aspirin (ASPIRIN ADULT LOW DOSE) 81 MG EC tablet Take 81 mg by mouth daily       atorvastatin (LIPITOR) 80 MG tablet Take 80 mg by mouth every evening       Ferrous Gluconate-C-Folic Acid (IRON-C PO) Take 65 mg by mouth        FLUoxetine (PROZAC) 20 MG capsule Take 2 capsules (40 mg) by mouth daily 30 capsule 0     furosemide (LASIX) 20 MG tablet One daily til med gone 30 tablet 0     lisinopril (ZESTRIL) 40 MG tablet Take 40 mg by mouth daily       metoprolol tartrate (LOPRESSOR) 25 MG tablet Take 25 mg by mouth 2 times daily       nitroGLYcerin (NITROSTAT) 0.4 MG sublingual tablet Place 0.4 mg under the tongue every 5 minutes as needed for chest pain For chest pain place 1 tablet under the tongue every 5 minutes for 3 doses. If symptoms persist 5 minutes after 1st dose call 911.        rivaroxaban ANTICOAGULANT (XARELTO) 20 MG TABS tablet Take 20 mg by mouth daily (with dinner)       rosuvastatin (CRESTOR) 40 MG tablet Take 40 mg by mouth daily       sotalol (BETAPACE) 80 MG tablet Take 80 mg by mouth daily       No facility-administered encounter medications on file as of 1/4/2022.             O:   NAD, WDWN, Alert & Oriented, Mood & Affect wnl, Vitals stable   Here today alone   /67 (BP Location: Left arm, Patient Position: Sitting, Cuff Size: Adult Regular)   Pulse 82   SpO2 94%    General appearance normal   Vitals stable   Alert, oriented and in no acute distress      Following lymph nodes palpated: Occipital, Cervical, Supraclavicular no lad  R nasal tip 3mm scaly papule       Eyes: Conjunctivae/lids:Normal     ENT: Lips, buccal mucosa, tongue: normal    MSK:Normal    Cardiovascular: peripheral edema none    Pulm: Breathing Normal    Lymph Nodes: No Head and Neck Lymphadenopathy     Neuro/Psych: Orientation:Alert and Orientedx3 ; Mood/Affect:normal       A/P:  1. R nasal tip squamous cell carcinoma  MOHS:   Location    The rationale for Mohs surgery was discussed with the patient and consent was obtained.  The risks and benefits as well as alternatives to therapy were discussed, in detail.  Specifically, the risks of infection, scarring, bleeding, prolonged wound healing, incomplete removal, allergy to anesthesia, nerve  injury and recurrence were addressed.  Indication for Mohs was Location. Prior to the procedure, the treatment site was clearly identified and, if available, confirmed with previous photos and confirmed by the patient   All components of the Universal Protocol/PAUSE rule were completed.  The Mohs surgeon operated in two distinct and integrated capacities as the surgeon and pathologist.      The area was prepped with Betasept.  A rim of normal appearing skin was marked circumferentially around the lesion.  The area was infiltrated with local anesthesia.  The tumor was first debulked to remove all clinically apparent tumor.  An incision following the standard Mohs approach was done and the specimen was oriented,mapped and placed in 2 block(s).  Each specimen was then chromacoded and processed in the Mohs laboratory using standard Mohs technique and submitted for frozen section histology.  Frozen section analysis showed  residual tumor but CLEAR MARGINS.    First stage:There is a proliferation of irregular nests of abnormal squamous cells arising from the epidermis and invading the dermis. These are well differentiated. The dermis shows a variable superficial perivascular inflammatory infiltrate.     The tumor was excised using standard Mohs technique in 2 stages(s).  CLEAR MARGINS OBTAINED and Final defect size was 1.1 cm.     We discussed the options for wound management in full with the patient including risks/benefits/ possible outcomes.    REPAIR COMPLEX: Because of the tightness of the surrounding skin and Because of the size and full thickness nature of the defect, Because of the tightness of the surrounding skin, To maintain form and function and Because of the proximity to the nasal tip and ala, a complex closure was planned. After LEC anesthesia and prep, Burow's triangles were excised in the relaxed skin tension lines. The wound edges were widely undermined greater than width of the defect on both sides (1.5  cm) by dissection in the subcutaneous plane until adequate tissue mobility was obtained. Hemostasis was obtained. The wound edges were closed in a layered fashion using Vicryl and Fast Absorbing Plain Gut sutures. Postoperative length was 4 cm.   EBL minimal; complications none; wound care routine.  The patient was discharged in good condition and will return in one week for wound evaluation.  It was a pleasure speaking to Rubén Lane today.  Previous clinic notes and pertinent laboratory tests were reviewed prior to Rubén Lane's visit.  Nature and genetics of benign skin lesions dicussed with patient.  Signs and Symptoms of skin cancer discussed with patient.  Patient encouraged to perform monthly skin exams.  UV precautions reviewed with patient.  Risks of non-melanoma skin cancer discussed with patient   Return to clinic 3 months

## 2022-01-04 NOTE — LETTER
1/4/2022         RE: Rubén Lane  [address] [facility] [address]        Dear Colleague,    Thank you for referring your patient, Rubén Lane, to the [facility]. Please see a copy of my visit note below.    Surgical Office Location :   [facility]  [address] [place] [address]      Rubén Lane is an extremely pleasant 88 year old year old male patient here today for evaluation and managment of squamous cell carcinoma on right nasal tip.  Patient has no other skin complaints today.  Remainder of the HPI, Meds, PMH, Allergies, FH, and SH was reviewed in chart.      Past Medical History:   Diagnosis Date     Squamous cell carcinoma of skin, unspecified        History reviewed. No pertinent surgical history.     History reviewed. No pertinent family history.    Social History     Socioeconomic History     Marital status:      Spouse name: Not on file     Number of children: Not on file     Years of education: Not on file     Highest education level: Not on file   Occupational History     Not on file   Tobacco Use     Smoking status: Former Smoker     Smokeless tobacco: Never Used   Substance and Sexual Activity     Alcohol use: Not on file     Drug use: Not on file     Sexual activity: Not on file   Other Topics Concern     Not on file   Social History Narrative     Not on file     Social Determinants of Health     Financial Resource Strain: Not on file   Food Insecurity: Not on file   Transportation Needs: Not on file   Physical Activity: Not on file   Stress: Not on file   Social Connections: Not on file   Intimate Partner Violence: Not on file   Housing Stability: Not on file       Outpatient Encounter Medications as of 1/4/2022   Medication Sig Dispense Refill     acetaminophen (TYLENOL) 500 MG tablet Take 1,000 mg by mouth 3 times daily        albuterol (PROAIR HFA/PROVENTIL HFA/VENTOLIN HFA) 108 (90 Base) MCG/ACT inhaler Inhale 2 puffs into the  lungs 4 times daily        apixaban ANTICOAGULANT (ELIQUIS) 5 MG tablet Take 5 mg by mouth 2 times daily        aspirin (ASPIRIN ADULT LOW DOSE) 81 MG EC tablet Take 81 mg by mouth daily       atorvastatin (LIPITOR) 80 MG tablet Take 80 mg by mouth every evening       Ferrous Gluconate-C-Folic Acid (IRON-C PO) Take 65 mg by mouth       FLUoxetine (PROZAC) 20 MG capsule Take 2 capsules (40 mg) by mouth daily 30 capsule 0     furosemide (LASIX) 20 MG tablet One daily til med gone 30 tablet 0     lisinopril (ZESTRIL) 40 MG tablet Take 40 mg by mouth daily       metoprolol tartrate (LOPRESSOR) 25 MG tablet Take 25 mg by mouth 2 times daily       nitroGLYcerin (NITROSTAT) 0.4 MG sublingual tablet Place 0.4 mg under the tongue every 5 minutes as needed for chest pain For chest pain place 1 tablet under the tongue every 5 minutes for 3 doses. If symptoms persist 5 minutes after 1st dose call 911.        rivaroxaban ANTICOAGULANT (XARELTO) 20 MG TABS tablet Take 20 mg by mouth daily (with dinner)       rosuvastatin (CRESTOR) 40 MG tablet Take 40 mg by mouth daily       sotalol (BETAPACE) 80 MG tablet Take 80 mg by mouth daily       No facility-administered encounter medications on file as of 1/4/2022.             O:   NAD, WDWN, Alert & Oriented, Mood & Affect wnl, Vitals stable   Here today alone   /67 (BP Location: Left arm, Patient Position: Sitting, Cuff Size: Adult Regular)   Pulse 82   SpO2 94%    General appearance normal   Vitals stable   Alert, oriented and in no acute distress      Following lymph nodes palpated: Occipital, Cervical, Supraclavicular no lad  R nasal tip 3mm scaly papule       Eyes: Conjunctivae/lids:Normal     ENT: Lips, buccal mucosa, tongue: normal    MSK:Normal    Cardiovascular: peripheral edema none    Pulm: Breathing Normal    Lymph Nodes: No Head and Neck Lymphadenopathy     Neuro/Psych: Orientation:Alert and Orientedx3 ; Mood/Affect:normal       A/P:  1. R nasal tip squamous cell  carcinoma  MOHS:   Location    The rationale for Mohs surgery was discussed with the patient and consent was obtained.  The risks and benefits as well as alternatives to therapy were discussed, in detail.  Specifically, the risks of infection, scarring, bleeding, prolonged wound healing, incomplete removal, allergy to anesthesia, nerve injury and recurrence were addressed.  Indication for Mohs was Location. Prior to the procedure, the treatment site was clearly identified and, if available, confirmed with previous photos and confirmed by the patient   All components of the Universal Protocol/PAUSE rule were completed.  The Mohs surgeon operated in two distinct and integrated capacities as the surgeon and pathologist.      The area was prepped with Betasept.  A rim of normal appearing skin was marked circumferentially around the lesion.  The area was infiltrated with local anesthesia.  The tumor was first debulked to remove all clinically apparent tumor.  An incision following the standard Mohs approach was done and the specimen was oriented,mapped and placed in 2 block(s).  Each specimen was then chromacoded and processed in the Mohs laboratory using standard Mohs technique and submitted for frozen section histology.  Frozen section analysis showed  residual tumor but CLEAR MARGINS.    First stage:There is a proliferation of irregular nests of abnormal squamous cells arising from the epidermis and invading the dermis. These are well differentiated. The dermis shows a variable superficial perivascular inflammatory infiltrate.     The tumor was excised using standard Mohs technique in 2 stages(s).  CLEAR MARGINS OBTAINED and Final defect size was 1.1 cm.     We discussed the options for wound management in full with the patient including risks/benefits/ possible outcomes.    REPAIR COMPLEX: Because of the tightness of the surrounding skin and Because of the size and full thickness nature of the defect, Because of the  tightness of the surrounding skin, To maintain form and function and Because of the proximity to the nasal tip and ala, a complex closure was planned. After LEC anesthesia and prep, Burow's triangles were excised in the relaxed skin tension lines. The wound edges were widely undermined greater than width of the defect on both sides (1.5 cm) by dissection in the subcutaneous plane until adequate tissue mobility was obtained. Hemostasis was obtained. The wound edges were closed in a layered fashion using Vicryl and Fast Absorbing Plain Gut sutures. Postoperative length was 4 cm.   EBL minimal; complications none; wound care routine.  The patient was discharged in good condition and will return in one week for wound evaluation.  It was a pleasure speaking to Rubén Lane today.  Previous clinic notes and pertinent laboratory tests were reviewed prior to Rubén Lane's visit.  Nature and genetics of benign skin lesions dicussed with patient.  Signs and Symptoms of skin cancer discussed with patient.  Patient encouraged to perform monthly skin exams.  UV precautions reviewed with patient.  Risks of non-melanoma skin cancer discussed with patient   Return to clinic 3 months        Again, thank you for allowing me to participate in the care of your patient.        Sincerely,        Ernie Keller MD

## 2022-01-11 ENCOUNTER — OFFICE VISIT (OUTPATIENT)
Dept: DERMATOLOGY | Facility: CLINIC | Age: 87
End: 2022-01-11
Payer: COMMERCIAL

## 2022-01-11 DIAGNOSIS — Z48.02 ATTENTION TO DRESSINGS AND SUTURES: Primary | ICD-10-CM

## 2022-01-11 DIAGNOSIS — Z48.00 ATTENTION TO DRESSINGS AND SUTURES: Primary | ICD-10-CM

## 2022-01-11 PROCEDURE — 99207 PR NO CHARGE NURSE ONLY: CPT

## 2022-01-11 NOTE — PATIENT INSTRUCTIONS
WOUND CARE INSTRUCTIONS  for  ONE WEEK AFTER SURGERY              1) Clean and dry the area with plain tap water using a Q-tip or sterile gauze pad.  2) Apply vaseline, Aquaphor Polysporin or Bacitracin ointment to the open area.  3) Cover the wound with a band-aid or a sterile non-stick gauze pad and micropore paper tape.         SIGNS OF INFECTION  - If you notice any of these signs of infection, call your doctor right away: expanding redness around the wound.  - Yellow or greenish-colored pus or cloudy wound drainage.    - Red streaking spreading from the wound.  - Increased swelling, tenderness, or pain around the wound.   - Fever.    Please remember that yellow and clear drainage from a wound can be normal and related to normal wound healing.  Isolated drainage from a wound without a combination of the above features does not indicate infection.       *Once the bandages are removed, the scar will be red and firm (especially in the lip/chin area). This is normal and will fade in time. It might take 6-12 months for this to happen.     *Massaging the area will help the scar soften and fade quicker. Begin to massage the area one month after the bandages have been removed. To massage apply pressure directly and firmly over the scar with the fingertips and move in a circular motion. Massage the area for a few minutes several times a day. Continue to massage the site for several months.    *Approximately 6-8 weeks after surgery it is not uncommon to see the formation of  tender pimple-like  bump along the scar. This is normal. As the scar continues to mature and the stitches underneath the skin begin to dissolve, this might occur. Do not pick or squeeze, this will resolve on it s own. Should one break open producing a small amount of drainage, apply Polysporin or Bacitracin ointment a few times a day until the wound is completely healed.    *Numbness in the surgical area is expected. It might take 12-18 months for the  feeling to return to normal. During this time sensations of itchiness, tingling and occasional sharp pains might be noted. These feelings are normal and will subside once the nerves have completely healed.         IN CASE OF EMERGENCY: Dr Keller [phone]     If you were seen in [place] call: [phone]    If you were seen in [place] call: [phone]

## 2022-01-11 NOTE — PROGRESS NOTES
Pt returned to clinic for post surgery 1 week follow up bandage change. Pt has no complaints, denies pain. Bandage removed from the Nose. The area cleansed with normal saline. Site is healing and wound edges approximating well. There was lots of drainage and the wound was open where the sutures where. Healthy granulation tissue present. I applied Aquaphor and telfa to the nose. Gave pt open wound care.    Advised to watch for signs/sx of infection; spreading redness, drainage, odor, fever. Call or report promptly to clinic. Pt given written instructions and informed to rtc as needed. Patient verbalized understanding.

## 2022-01-17 DIAGNOSIS — F32.A DEPRESSION, UNSPECIFIED DEPRESSION TYPE: ICD-10-CM

## 2022-06-13 ENCOUNTER — OFFICE VISIT (OUTPATIENT)
Dept: FAMILY MEDICINE | Facility: CLINIC | Age: 87
End: 2022-06-13
Payer: COMMERCIAL

## 2022-06-13 VITALS
WEIGHT: 173 LBS | HEART RATE: 74 BPM | HEIGHT: 68 IN | RESPIRATION RATE: 18 BRPM | BODY MASS INDEX: 26.22 KG/M2 | OXYGEN SATURATION: 94 % | DIASTOLIC BLOOD PRESSURE: 74 MMHG | SYSTOLIC BLOOD PRESSURE: 110 MMHG

## 2022-06-13 DIAGNOSIS — F32.A DEPRESSION, UNSPECIFIED DEPRESSION TYPE: ICD-10-CM

## 2022-06-13 DIAGNOSIS — N39.0 URINARY TRACT INFECTION WITH HEMATURIA, SITE UNSPECIFIED: ICD-10-CM

## 2022-06-13 DIAGNOSIS — I48.0 PAROXYSMAL ATRIAL FIBRILLATION (H): ICD-10-CM

## 2022-06-13 DIAGNOSIS — I50.22 CHRONIC SYSTOLIC HEART FAILURE (H): ICD-10-CM

## 2022-06-13 DIAGNOSIS — R31.9 URINARY TRACT INFECTION WITH HEMATURIA, SITE UNSPECIFIED: ICD-10-CM

## 2022-06-13 DIAGNOSIS — R30.0 DYSURIA: Primary | ICD-10-CM

## 2022-06-13 PROBLEM — U07.1 COVID-19: Status: RESOLVED | Noted: 2021-04-05 | Resolved: 2022-06-13

## 2022-06-13 LAB
ALBUMIN UR-MCNC: 30 MG/DL
APPEARANCE UR: CLEAR
BILIRUB UR QL STRIP: ABNORMAL
COLOR UR AUTO: YELLOW
GLUCOSE UR STRIP-MCNC: NEGATIVE MG/DL
HGB UR QL STRIP: ABNORMAL
KETONES UR STRIP-MCNC: NEGATIVE MG/DL
LEUKOCYTE ESTERASE UR QL STRIP: NEGATIVE
MUCOUS THREADS #/AREA URNS LPF: PRESENT /LPF
NITRATE UR QL: NEGATIVE
PH UR STRIP: 6 [PH] (ref 5–7)
RBC #/AREA URNS AUTO: ABNORMAL /HPF
SP GR UR STRIP: 1.02 (ref 1–1.03)
SQUAMOUS #/AREA URNS AUTO: ABNORMAL /LPF
UROBILINOGEN UR STRIP-ACNC: 4 E.U./DL
WBC #/AREA URNS AUTO: ABNORMAL /HPF

## 2022-06-13 PROCEDURE — 81001 URINALYSIS AUTO W/SCOPE: CPT | Performed by: FAMILY MEDICINE

## 2022-06-13 PROCEDURE — 99214 OFFICE O/P EST MOD 30 MIN: CPT | Performed by: FAMILY MEDICINE

## 2022-06-13 RX ORDER — LISINOPRIL 40 MG/1
20 TABLET ORAL DAILY
Qty: 30 TABLET | Refills: 0 | COMMUNITY
Start: 2022-06-13 | End: 2023-09-07

## 2022-06-13 RX ORDER — SULFAMETHOXAZOLE/TRIMETHOPRIM 800-160 MG
1 TABLET ORAL 2 TIMES DAILY
Qty: 14 TABLET | Refills: 0 | Status: SHIPPED | OUTPATIENT
Start: 2022-06-13 | End: 2023-09-07

## 2022-06-13 ASSESSMENT — PAIN SCALES - GENERAL: PAINLEVEL: NO PAIN (0)

## 2022-06-13 NOTE — PROGRESS NOTES
"S: Rubén Lane is a 89 year old male with some dysuria.  Would like UA.  Off and on for a few days.  No frequency or blood or fever    Fatigue: tired more than he thinks he should be.  Dizzy sometimes when getting up.  Cardiomyopathy, follows with cards.  110 on BP, dtr says it runs lower sometimes.      CAD: cabg/stents in past.  Complex.  No new cp.  Chronic sob.  No LE edema  Most of his meds/labs with cardiology    Hx afib: no palipitations.  Has pacer.  On anticoag    Depression: would like refills on prozac.  Helps.  Only med he's on.  t hinks 20mg is as good as current 40, would like to go back.      O;/74   Pulse 74   Resp 18   Ht 1.727 m (5' 8\")   Wt 78.5 kg (173 lb)   SpO2 94%   BMI 26.30 kg/m    GEN: Alert and oriented, in no acute distress  Systolic 3/6 murmur.  Regular rhythm, rate fine  EXT: no edema or lesions noted in lower extremities  RESP: lungs clear bilaterally, good effort  Neck: neck supple without mass or lymphadenopathy    UA with blood    A: dysuria.  nos  Fatigue: meds?  CAD: cabg/stents in past.  Complex.  No new cp.  Hx afib: no palipitations.  Stable   Depression: would like refills on prozac    P: down to 20mg on prozac.  Cut lisionpril down to 20 as well.      Will treat as uti of some sort.  F/u if not improving.  They will let us know . If hematuria persists, may need Urology, they know that.  He'll watch for pink or red urine.    "

## 2022-06-28 ENCOUNTER — TELEPHONE (OUTPATIENT)
Dept: FAMILY MEDICINE | Facility: CLINIC | Age: 87
End: 2022-06-28

## 2022-06-28 NOTE — TELEPHONE ENCOUNTER
"S-(situation): Patient reporting left eye appears to have broken blood vessel. He is requesting an appt.     B-(background): on apixaban for afib    A-(assessment): patient noticed left eye redness, \"looks like a vein\" He reports it is \"sort of sore\". No change in vision. He has had recent issues with his glasses and has followed up at [facility]-he feels he is seeing out of the bifocal portion of his lens. No eye drainage, headache or fever. He does not check his BP at home.    R-(recommendations): Advised to check BP at home as he has a machine. Notify care team if consistently >140/90. Saw Dr Momin last 06/13/22. Scheduled appt for 06/29/22. If symptoms worsen ie: pain, change in vision, increased bleeding elsewhere-blood in urine or stool go to ER for evaluation.   Lea GARCIA RN      "

## 2022-06-28 NOTE — TELEPHONE ENCOUNTER
Reason for Call:  Eye Issue    Detailed comments: Left Eye is red. This started Sunday getting worse. Pt is wondering if he should be seen at the clinic or eye Dr. Please advise.     Phone Number Patient can be reached at: Home number on file [phone] (home)    Best Time: Any Time      Can we leave a detailed message on this number? YES    Call taken on 6/28/2022 at 8:51 AM by Nicolasa Flaherty

## 2022-06-29 ENCOUNTER — OFFICE VISIT (OUTPATIENT)
Dept: FAMILY MEDICINE | Facility: CLINIC | Age: 87
End: 2022-06-29
Payer: COMMERCIAL

## 2022-06-29 VITALS
WEIGHT: 178 LBS | RESPIRATION RATE: 16 BRPM | DIASTOLIC BLOOD PRESSURE: 64 MMHG | TEMPERATURE: 97.6 F | OXYGEN SATURATION: 98 % | HEIGHT: 68 IN | BODY MASS INDEX: 26.98 KG/M2 | SYSTOLIC BLOOD PRESSURE: 100 MMHG

## 2022-06-29 DIAGNOSIS — H11.32 SUBCONJUNCTIVAL BLEED, LEFT: Primary | ICD-10-CM

## 2022-06-29 PROCEDURE — 99213 OFFICE O/P EST LOW 20 MIN: CPT | Performed by: FAMILY MEDICINE

## 2022-06-29 ASSESSMENT — PAIN SCALES - GENERAL: PAINLEVEL: NO PAIN (0)

## 2022-06-29 NOTE — PROGRESS NOTES
"S :Rubén Lane is a 89 year old male with L eye redness. No pain, no feeling of anything in eye, no vision changes, no headache, no tearing increased.      \"feels fine, seeing fine\"    Noticed red area inside part of eye in mirror    O:/64   Temp 97.6  F (36.4  C) (Tympanic)   Resp 16   Ht 1.727 m (5' 8\")   Wt 80.7 kg (178 lb)   SpO2 98%   BMI 27.06 kg/m    GEN: Alert and oriented, in no acute distress  Some mild erythema, broken vessel, L medial conjunctiva  Pupils normal    A :subconjunctival hemorrhage    P: f/u visual sx or pain. .  He agrees.  Otherwise OK to let heal at home.    "

## 2022-10-12 ENCOUNTER — OFFICE VISIT (OUTPATIENT)
Dept: FAMILY MEDICINE | Facility: CLINIC | Age: 87
End: 2022-10-12
Payer: COMMERCIAL

## 2022-10-12 ENCOUNTER — TELEPHONE (OUTPATIENT)
Dept: FAMILY MEDICINE | Facility: CLINIC | Age: 87
End: 2022-10-12

## 2022-10-12 VITALS
WEIGHT: 179 LBS | TEMPERATURE: 98.5 F | RESPIRATION RATE: 16 BRPM | DIASTOLIC BLOOD PRESSURE: 82 MMHG | BODY MASS INDEX: 27.13 KG/M2 | HEART RATE: 48 BPM | SYSTOLIC BLOOD PRESSURE: 124 MMHG | HEIGHT: 68 IN

## 2022-10-12 DIAGNOSIS — I50.22 CHRONIC SYSTOLIC HEART FAILURE (H): ICD-10-CM

## 2022-10-12 DIAGNOSIS — Z91.81 AT HIGH RISK FOR FALLS: ICD-10-CM

## 2022-10-12 DIAGNOSIS — Z23 NEED FOR PROPHYLACTIC VACCINATION AND INOCULATION AGAINST INFLUENZA: ICD-10-CM

## 2022-10-12 DIAGNOSIS — Z00.00 MEDICARE ANNUAL WELLNESS VISIT, SUBSEQUENT: Primary | ICD-10-CM

## 2022-10-12 DIAGNOSIS — Z79.01 ANTICOAGULATED: ICD-10-CM

## 2022-10-12 DIAGNOSIS — Z23 HIGH PRIORITY FOR 2019-NCOV VACCINE: ICD-10-CM

## 2022-10-12 DIAGNOSIS — I48.0 PAROXYSMAL ATRIAL FIBRILLATION (H): ICD-10-CM

## 2022-10-12 DIAGNOSIS — I25.10 ATHEROSCLEROSIS OF NATIVE CORONARY ARTERY OF NATIVE HEART WITHOUT ANGINA PECTORIS: ICD-10-CM

## 2022-10-12 DIAGNOSIS — F32.A DEPRESSION, UNSPECIFIED DEPRESSION TYPE: ICD-10-CM

## 2022-10-12 DIAGNOSIS — I10 ESSENTIAL (PRIMARY) HYPERTENSION: ICD-10-CM

## 2022-10-12 LAB
ANION GAP SERPL CALCULATED.3IONS-SCNC: 6 MMOL/L (ref 7–15)
BUN SERPL-MCNC: 10.1 MG/DL (ref 8–23)
CALCIUM SERPL-MCNC: 8.6 MG/DL (ref 8.8–10.2)
CHLORIDE SERPL-SCNC: 102 MMOL/L (ref 98–107)
CREAT SERPL-MCNC: 0.74 MG/DL (ref 0.67–1.17)
DEPRECATED HCO3 PLAS-SCNC: 30 MMOL/L (ref 22–29)
GFR SERPL CREATININE-BSD FRML MDRD: 87 ML/MIN/1.73M2
GLUCOSE SERPL-MCNC: 102 MG/DL (ref 70–99)
POTASSIUM SERPL-SCNC: 4.6 MMOL/L (ref 3.4–5.3)
SODIUM SERPL-SCNC: 138 MMOL/L (ref 136–145)

## 2022-10-12 PROCEDURE — 99214 OFFICE O/P EST MOD 30 MIN: CPT | Mod: 25 | Performed by: FAMILY MEDICINE

## 2022-10-12 PROCEDURE — 80048 BASIC METABOLIC PNL TOTAL CA: CPT | Performed by: FAMILY MEDICINE

## 2022-10-12 PROCEDURE — G0438 PPPS, INITIAL VISIT: HCPCS | Mod: 25 | Performed by: FAMILY MEDICINE

## 2022-10-12 PROCEDURE — 36415 COLL VENOUS BLD VENIPUNCTURE: CPT | Performed by: FAMILY MEDICINE

## 2022-10-12 PROCEDURE — 91313 COVID-19,PF,MODERNA BIVALENT: CPT | Performed by: FAMILY MEDICINE

## 2022-10-12 PROCEDURE — G0008 ADMIN INFLUENZA VIRUS VAC: HCPCS | Mod: 59 | Performed by: FAMILY MEDICINE

## 2022-10-12 PROCEDURE — 0134A COVID-19,PF,MODERNA BIVALENT: CPT | Performed by: FAMILY MEDICINE

## 2022-10-12 PROCEDURE — 90662 IIV NO PRSV INCREASED AG IM: CPT | Performed by: FAMILY MEDICINE

## 2022-10-12 ASSESSMENT — ENCOUNTER SYMPTOMS
DIARRHEA: 0
NAUSEA: 0
HEARTBURN: 0
JOINT SWELLING: 0
DYSURIA: 0
FREQUENCY: 0
EYE PAIN: 0
NERVOUS/ANXIOUS: 0
MYALGIAS: 0
WEAKNESS: 1
SORE THROAT: 0
CHILLS: 0
FEVER: 0
ARTHRALGIAS: 0
ABDOMINAL PAIN: 0
DIZZINESS: 0
HEMATURIA: 0
PALPITATIONS: 0
PARESTHESIAS: 0
CONSTIPATION: 0
HEMATOCHEZIA: 0
COUGH: 0
SHORTNESS OF BREATH: 1
HEADACHES: 0

## 2022-10-12 ASSESSMENT — PAIN SCALES - GENERAL: PAINLEVEL: NO PAIN (0)

## 2022-10-12 ASSESSMENT — PATIENT HEALTH QUESTIONNAIRE - PHQ9
SUM OF ALL RESPONSES TO PHQ QUESTIONS 1-9: 8
10. IF YOU CHECKED OFF ANY PROBLEMS, HOW DIFFICULT HAVE THESE PROBLEMS MADE IT FOR YOU TO DO YOUR WORK, TAKE CARE OF THINGS AT HOME, OR GET ALONG WITH OTHER PEOPLE: SOMEWHAT DIFFICULT
SUM OF ALL RESPONSES TO PHQ QUESTIONS 1-9: 8

## 2022-10-12 ASSESSMENT — ACTIVITIES OF DAILY LIVING (ADL): CURRENT_FUNCTION: NO ASSISTANCE NEEDED

## 2022-10-12 NOTE — PATIENT INSTRUCTIONS

## 2022-10-12 NOTE — LETTER
October 17, 2022      Rubén Lane  [address] [facility] [address]        Dear ,    We are writing to inform you of your test results.    Labs stable.    Resulted Orders   Basic metabolic panel  (Ca, Cl, CO2, Creat, Gluc, K, Na, BUN)   Result Value Ref Range    Sodium 138 136 - 145 mmol/L    Potassium 4.6 3.4 - 5.3 mmol/L    Chloride 102 98 - 107 mmol/L    Carbon Dioxide (CO2) 30 (H) 22 - 29 mmol/L    Anion Gap 6 (L) 7 - 15 mmol/L    Urea Nitrogen 10.1 8.0 - 23.0 mg/dL    Creatinine 0.74 0.67 - 1.17 mg/dL    Calcium 8.6 (L) 8.8 - 10.2 mg/dL    Glucose 102 (H) 70 - 99 mg/dL    GFR Estimate 87 >60 mL/min/1.73m2      Comment:      Effective December 21, 2021 eGFRcr in adults is calculated using the 2021 CKD-EPI creatinine equation which includes age and gender (Paris et al., NEJM, DOI: 10.1056/UHBLlf5258759)       If you have any questions or concerns, please call the clinic at the number listed above.       Sincerely,      Reed Momin MD

## 2022-10-12 NOTE — TELEPHONE ENCOUNTER
Order/Referral Request    Who is requesting: Nurse with [facility] care     Orders being requested: Skilled Nursing eval    Reason service is needed/diagnosis:     When are orders needed by: ASAP    Has this been discussed with Provider: Yes    Does patient have a preference on a Group/Provider/Facility? No      Does patient have an appointment scheduled?: No    Where to send orders: Fax    Okay to leave a detailed message?: Yes at Other phone number:  [facility] - [phone] Ext [phone]

## 2022-10-12 NOTE — PROGRESS NOTES
"SUBJECTIVE:   Rubén is a 89 year old who presents for Preventive Visit.      Patient has been advised of split billing requirements and indicates understanding: Yes  Are you in the first 12 months of your Medicare coverage?  No    Healthy Habits:     In general, how would you rate your overall health?  Fair    Frequency of exercise:  None    Do you usually eat at least 4 servings of fruit and vegetables a day, include whole grains    & fiber and avoid regularly eating high fat or \"junk\" foods?  Yes    Taking medications regularly:  Yes    Medication side effects:  Not applicable    Ability to successfully perform activities of daily living:  No assistance needed    Home Safety:  No safety concerns identified    Hearing Impairment:  Need to ask people to speak up or repeat themselves    In the past 6 months, have you been bothered by leaking of urine?  No    In general, how would you rate your overall mental or emotional health?  Fair      PHQ-2 Total Score: 0    Additional concerns today:  No    Fall risk: needs assessment for bathroom, getting around house.  dtr and wife here, they agree.  Doesn't drive, hard to get out of house, gets tired, risk of falling  Follows with cardiology still , ischemic cardiomyopathy, progressive SOB, but feels he's doing OK at home    Afib: rate controlled with sotolol, metoprolol.  eliquis as well  Anticoagulation: see above     Chronic systolic failure: cardiology has him on ace/beta and sotalol.  ASA as well.  No LE edema.  Chronic sob    Depression: stable on fluoxetine.      Hx   Fall risk  Fallen 2 or more times in the past year?: Yes  Any fall with injury in the past year?: Yes    Cognitive Screening   1) Repeat 3 items (Leader, Season, Table)    2) Clock draw:  Normal clock   3) 3 item recall: Recalls NO objects   Results: normal clock- recalls no objects   Mini-CogTM Copyright S Sudha. Licensed by the author for use in [facility]; reprinted with permission " (art@[facility]). All rights reserved.      Do you have sleep apnea, excessive snoring or daytime drowsiness?: no    Reviewed and updated as needed this visit by clinical staff                  Reviewed and updated as needed this visit by Provider                 Social History     Tobacco Use     Smoking status: Former     Smokeless tobacco: Never   Substance Use Topics     Alcohol use: Not on file     If you drink alcohol do you typically have >3 drinks per day or >7 drinks per week? No    Alcohol Use 10/12/2022   Prescreen: >3 drinks/day or >7 drinks/week? No   No flowsheet data found.        1. Having falls getting in and out of the shower, has a shower chair and a bar in his bathtub, wondering if he can get order or note to get a safer and different shower/ bath equipment     Current providers sharing in care for this patient include:   Patient Care Team:  Tata Maguire as PCP - General (Internal Medicine)  Reed Momin MD as Assigned PCP  Lizzy Early PA-C as Referring Physician (Internal Medicine)  Jayne Wagner PA-C as Physician Assistant (Dermatology)  Ernie Keller MD as Assigned Surgical Provider    The following health maintenance items are reviewed in Epic and correct as of today:  Health Maintenance   Topic Date Due     ALT  Never done     HF ACTION PLAN  Never done     TSH W/FREE T4 REFLEX  Never done     ANNUAL REVIEW OF HM ORDERS  Never done     DEPRESSION ACTION PLAN  Never done     CBC  Never done     HEPATITIS B IMMUNIZATION (1 of 3 - 3-dose series) Never done     COVID-19 Vaccine (3 - Booster for Moderna series) 08/09/2021     MEDICARE ANNUAL WELLNESS VISIT  10/17/2021     BMP  04/16/2022     INFLUENZA VACCINE (1) 09/01/2022     LIPID  10/16/2022     PHQ-9  04/12/2023     FALL RISK ASSESSMENT  10/12/2023     DTAP/TDAP/TD IMMUNIZATION (2 - Td or Tdap) 03/03/2024     ADVANCE CARE PLANNING  05/12/2026     Pneumococcal Vaccine: 65+ Years  Completed     ZOSTER IMMUNIZATION   "Completed     IPV IMMUNIZATION  Aged Out     MENINGITIS IMMUNIZATION  Aged Out             Review of Systems   Constitutional: Negative for chills and fever.   HENT: Negative for congestion, ear pain, hearing loss and sore throat.    Eyes: Negative for pain and visual disturbance.   Respiratory: Positive for shortness of breath. Negative for cough.    Cardiovascular: Negative for chest pain, palpitations and peripheral edema.   Gastrointestinal: Negative for abdominal pain, constipation, diarrhea, heartburn, hematochezia and nausea.   Genitourinary: Negative for dysuria, frequency, genital sores, hematuria, impotence, penile discharge and urgency.   Musculoskeletal: Negative for arthralgias, joint swelling and myalgias.   Skin: Negative for rash.   Neurological: Positive for weakness. Negative for dizziness, headaches and paresthesias.   Psychiatric/Behavioral: Negative for mood changes. The patient is not nervous/anxious.          OBJECTIVE:   /82   Pulse (!) 48   Temp 98.5  F (36.9  C) (Tympanic)   Resp 16   Ht 1.727 m (5' 8\")   Wt 81.2 kg (179 lb)   BMI 27.22 kg/m   Estimated body mass index is 27.06 kg/m  as calculated from the following:    Height as of 6/29/22: 1.727 m (5' 8\").    Weight as of 6/29/22: 80.7 kg (178 lb).  Physical Exam  GEN: Alert and oriented, in no acute distress  3/6 holosystolic murmur, known  EXT: no edema or lesions noted in lower extremities  Gait is slow with cane        ASSESSMENT / PLAN:   Wellness visit  Fall risk: needs assessmen   ischemic cardiomyopathy, sob slowly progressive  Afib: rate controlled  Anticoagulation: see above   Chronic systolic failure: clinically stable  Depression: stable on fluoxetine.      Reviewed meds.  Update met panel   Home health occ med eval for gait, ambulation, safety, assistive device recommendations.  They are excited about that.  Don't want him falling     F/u in a year, earlier prn      COUNSELING:  Reviewed preventive health " "counseling, as reflected in patient instructions       Regular exercise       Healthy diet/nutrition    Estimated body mass index is 27.06 kg/m  as calculated from the following:    Height as of 6/29/22: 1.727 m (5' 8\").    Weight as of 6/29/22: 80.7 kg (178 lb).        He reports that he has quit smoking. He has never used smokeless tobacco.      Appropriate preventive services were discussed with this patient, including applicable screening as appropriate for cardiovascular disease, diabetes, osteopenia/osteoporosis, and glaucoma.  As appropriate for age/gender, discussed screening for colorectal cancer, prostate cancer, breast cancer, and cervical cancer. Checklist reviewing preventive services available has been given to the patient.    Reviewed patients plan of care and provided an AVS. The Basic Care Plan (routine screening as documented in Health Maintenance) for Rubén meets the Care Plan requirement. This Care Plan has been established and reviewed with the Patient.      Reed Momin MD  [facility]    Identified Health Risks:  "

## 2022-10-12 NOTE — TELEPHONE ENCOUNTER
Cherise from [facility]  asking to add skilled nursing for med management and teaching , verbal orders given. Patient currently has physical and occupational therapy.   Lea GARCIA RN

## 2022-10-13 ENCOUNTER — TELEPHONE (OUTPATIENT)
Dept: FAMILY MEDICINE | Facility: CLINIC | Age: 87
End: 2022-10-13

## 2022-10-13 NOTE — TELEPHONE ENCOUNTER
Effie from [facility] called to let provider know that they receive the request and pt was admitted to home care

## 2022-11-25 ENCOUNTER — ALLIED HEALTH/NURSE VISIT (OUTPATIENT)
Dept: FAMILY MEDICINE | Facility: CLINIC | Age: 87
End: 2022-11-25
Payer: COMMERCIAL

## 2022-11-25 DIAGNOSIS — H91.90 HARD OF HEARING: Primary | ICD-10-CM

## 2022-11-25 PROCEDURE — 99207 PR NO CHARGE NURSE ONLY: CPT

## 2022-11-25 NOTE — PROGRESS NOTES
Called patient in regards to his appt with CMA today for ear flush. No documentation in his chart from Dr Momin. Patient's wife states Rubén continues to use q-tips every time he bathes. He always feels this way(dizzy) when he needs his ears flushed. Spoke with Dr William RN to see patient for vitals and check ears prior to CMA flushing.   Lea GARCIA RN

## 2022-11-25 NOTE — PROGRESS NOTES
Ears flushed.  No return of was on right side.  Tiny flecks left ear and also 1 cm size wax.  No C/O dizziness.  BP OK.  Advised if ears still feel plugged could try debrox.  If fever or pain, needs to be seen.  If dizziness does not go away, needs to be seen  Phyllis Casas RN

## 2023-06-26 ENCOUNTER — OFFICE VISIT (OUTPATIENT)
Dept: URGENT CARE | Facility: URGENT CARE | Age: 88
End: 2023-06-26
Payer: COMMERCIAL

## 2023-06-26 VITALS
DIASTOLIC BLOOD PRESSURE: 62 MMHG | TEMPERATURE: 97.4 F | HEART RATE: 69 BPM | OXYGEN SATURATION: 96 % | BODY MASS INDEX: 27.83 KG/M2 | SYSTOLIC BLOOD PRESSURE: 116 MMHG | WEIGHT: 183 LBS

## 2023-06-26 DIAGNOSIS — R31.0 GROSS HEMATURIA: Primary | ICD-10-CM

## 2023-06-26 LAB
ALBUMIN UR-MCNC: NEGATIVE MG/DL
APPEARANCE UR: CLEAR
BILIRUB UR QL STRIP: NEGATIVE
COLOR UR AUTO: YELLOW
GLUCOSE UR STRIP-MCNC: NEGATIVE MG/DL
HGB UR QL STRIP: ABNORMAL
KETONES UR STRIP-MCNC: NEGATIVE MG/DL
LEUKOCYTE ESTERASE UR QL STRIP: NEGATIVE
NITRATE UR QL: NEGATIVE
PH UR STRIP: 6.5 [PH] (ref 5–7)
RBC #/AREA URNS AUTO: NORMAL /HPF
SP GR UR STRIP: 1.01 (ref 1–1.03)
UROBILINOGEN UR STRIP-ACNC: 1 E.U./DL
WBC #/AREA URNS AUTO: NORMAL /HPF

## 2023-06-26 PROCEDURE — 99214 OFFICE O/P EST MOD 30 MIN: CPT | Performed by: PHYSICIAN ASSISTANT

## 2023-06-26 PROCEDURE — 81001 URINALYSIS AUTO W/SCOPE: CPT | Performed by: PHYSICIAN ASSISTANT

## 2023-06-26 PROCEDURE — 87086 URINE CULTURE/COLONY COUNT: CPT | Performed by: PHYSICIAN ASSISTANT

## 2023-06-26 RX ORDER — SULFAMETHOXAZOLE/TRIMETHOPRIM 800-160 MG
1 TABLET ORAL 2 TIMES DAILY
Qty: 14 TABLET | Refills: 0 | Status: SHIPPED | OUTPATIENT
Start: 2023-06-26 | End: 2023-07-03

## 2023-06-26 NOTE — PROGRESS NOTES
"  Assessment & Plan     Gross hematuria  No frequency or dysuria at this time. Culture pending. Gave written Rx for Bactrim that can be filled if he becomes symptomatic. Would recommend follow up with urology with continued gross hematuria.      - UA Macroscopic with reflex to Microscopic and Culture  - UA Microscopic with Reflex to Culture  - Urine Culture Aerobic Bacterial - lab collect; Future  - Urine Culture Aerobic Bacterial - lab collect  - sulfamethoxazole-trimethoprim (BACTRIM DS) 800-160 MG tablet; Take 1 tablet by mouth 2 times daily for 7 days  - Adult Urology  Referral; Future             BMI:   Estimated body mass index is 27.83 kg/m  as calculated from the following:    Height as of 10/12/22: 1.727 m (5' 8\").    Weight as of this encounter: 83 kg (183 lb).           Return in about 2 days (around 6/28/2023), or if symptoms worsen or fail to improve.    Lizzy Early PA-C  [facility]                  Subjective   Chief Complaint   Patient presents with     Urinary Problem     Blood in urine started about 3-4 days ago.          HPI     Hematuria     Onset of symptoms was 3 day(s) ago.  Course of illness is same.    Severity mild  Current and Associated symptoms: blood in urine  Treatment measures tried include None tried.  Predisposing factors include None.                Review of Systems         Objective    /62   Pulse 69   Temp 97.4  F (36.3  C) (Tympanic)   Wt 83 kg (183 lb)   SpO2 96%   BMI 27.83 kg/m    Body mass index is 27.83 kg/m .  Physical Exam  Constitutional:       General: He is not in acute distress.  Neurological:      Mental Status: He is alert.   Psychiatric:         Speech: Speech normal.         Behavior: Behavior normal.            Results for orders placed or performed in visit on 06/26/23 (from the past 24 hour(s))   UA Macroscopic with reflex to Microscopic and Culture    Specimen: Urine, Clean Catch   Result Value Ref Range "    Color Urine Yellow Colorless, Straw, Light Yellow, Yellow    Appearance Urine Clear Clear    Glucose Urine Negative Negative mg/dL    Bilirubin Urine Negative Negative    Ketones Urine Negative Negative mg/dL    Specific Gravity Urine 1.010 1.003 - 1.035    Blood Urine Trace (A) Negative    pH Urine 6.5 5.0 - 7.0    Protein Albumin Urine Negative Negative mg/dL    Urobilinogen Urine 1.0 0.2, 1.0 E.U./dL    Nitrite Urine Negative Negative    Leukocyte Esterase Urine Negative Negative   UA Microscopic with Reflex to Culture   Result Value Ref Range    RBC Urine 0-2 0-2 /HPF /HPF    WBC Urine 0-5 0-5 /HPF /HPF    Narrative    Urine Culture not indicated

## 2023-06-27 LAB — BACTERIA UR CULT: NO GROWTH

## 2023-07-14 DIAGNOSIS — F32.A DEPRESSION, UNSPECIFIED DEPRESSION TYPE: ICD-10-CM

## 2023-07-18 ENCOUNTER — APPOINTMENT (OUTPATIENT)
Dept: GENERAL RADIOLOGY | Facility: CLINIC | Age: 88
End: 2023-07-18
Attending: PHYSICIAN ASSISTANT
Payer: COMMERCIAL

## 2023-07-18 ENCOUNTER — APPOINTMENT (OUTPATIENT)
Dept: CT IMAGING | Facility: CLINIC | Age: 88
End: 2023-07-18
Attending: FAMILY MEDICINE
Payer: COMMERCIAL

## 2023-07-18 ENCOUNTER — HOSPITAL ENCOUNTER (EMERGENCY)
Facility: CLINIC | Age: 88
Discharge: HOME | End: 2023-07-18
Attending: PHYSICIAN ASSISTANT | Admitting: PHYSICIAN ASSISTANT
Payer: COMMERCIAL

## 2023-07-18 VITALS
HEART RATE: 77 BPM | DIASTOLIC BLOOD PRESSURE: 60 MMHG | RESPIRATION RATE: 20 BRPM | TEMPERATURE: 97.7 F | OXYGEN SATURATION: 94 % | SYSTOLIC BLOOD PRESSURE: 105 MMHG

## 2023-07-18 DIAGNOSIS — S50.811A ABRASION OF RIGHT FOREARM, INITIAL ENCOUNTER: ICD-10-CM

## 2023-07-18 DIAGNOSIS — S09.90XA CLOSED HEAD INJURY, INITIAL ENCOUNTER: ICD-10-CM

## 2023-07-18 DIAGNOSIS — S80.211A ABRASION OF RIGHT KNEE, INITIAL ENCOUNTER: ICD-10-CM

## 2023-07-18 DIAGNOSIS — Z79.01 ANTICOAGULATED: ICD-10-CM

## 2023-07-18 DIAGNOSIS — Z91.81 PERSONAL HISTORY OF FALL: ICD-10-CM

## 2023-07-18 PROCEDURE — 73562 X-RAY EXAM OF KNEE 3: CPT | Mod: RT

## 2023-07-18 PROCEDURE — 90715 TDAP VACCINE 7 YRS/> IM: CPT | Performed by: PHYSICIAN ASSISTANT

## 2023-07-18 PROCEDURE — 12002 RPR S/N/AX/GEN/TRNK2.6-7.5CM: CPT | Performed by: PHYSICIAN ASSISTANT

## 2023-07-18 PROCEDURE — 90471 IMMUNIZATION ADMIN: CPT | Performed by: PHYSICIAN ASSISTANT

## 2023-07-18 PROCEDURE — 250N000013 HC RX MED GY IP 250 OP 250 PS 637: Performed by: PHYSICIAN ASSISTANT

## 2023-07-18 PROCEDURE — 93005 ELECTROCARDIOGRAM TRACING: CPT | Performed by: FAMILY MEDICINE

## 2023-07-18 PROCEDURE — 70450 CT HEAD/BRAIN W/O DYE: CPT

## 2023-07-18 PROCEDURE — 99284 EMERGENCY DEPT VISIT MOD MDM: CPT | Performed by: FAMILY MEDICINE

## 2023-07-18 PROCEDURE — 250N000011 HC RX IP 250 OP 636: Performed by: PHYSICIAN ASSISTANT

## 2023-07-18 PROCEDURE — 72125 CT NECK SPINE W/O DYE: CPT

## 2023-07-18 PROCEDURE — 99285 EMERGENCY DEPT VISIT HI MDM: CPT | Mod: 25 | Performed by: FAMILY MEDICINE

## 2023-07-18 PROCEDURE — 93010 ELECTROCARDIOGRAM REPORT: CPT | Performed by: FAMILY MEDICINE

## 2023-07-18 RX ORDER — OXYCODONE HYDROCHLORIDE 5 MG/1
5 TABLET ORAL EVERY 6 HOURS PRN
Qty: 6 TABLET | Refills: 0 | Status: SHIPPED | OUTPATIENT
Start: 2023-07-18

## 2023-07-18 RX ORDER — OXYCODONE HYDROCHLORIDE 5 MG/1
5 TABLET ORAL ONCE
Status: COMPLETED | OUTPATIENT
Start: 2023-07-18 | End: 2023-07-18

## 2023-07-18 RX ADMIN — OXYCODONE HYDROCHLORIDE 5 MG: 5 TABLET ORAL at 20:52

## 2023-07-18 RX ADMIN — CLOSTRIDIUM TETANI TOXOID ANTIGEN (FORMALDEHYDE INACTIVATED), CORYNEBACTERIUM DIPHTHERIAE TOXOID ANTIGEN (FORMALDEHYDE INACTIVATED), BORDETELLA PERTUSSIS TOXOID ANTIGEN (GLUTARALDEHYDE INACTIVATED), BORDETELLA PERTUSSIS FILAMENTOUS HEMAGGLUTININ ANTIGEN (FORMALDEHYDE INACTIVATED), BORDETELLA PERTUSSIS PERTACTIN ANTIGEN, AND BORDETELLA PERTUSSIS FIMBRIAE 2/3 ANTIGEN 0.5 ML: 5; 2; 2.5; 5; 3; 5 INJECTION, SUSPENSION INTRAMUSCULAR at 22:43

## 2023-07-18 ASSESSMENT — VISUAL ACUITY: OU: 1

## 2023-07-18 ASSESSMENT — ENCOUNTER SYMPTOMS
CARDIOVASCULAR NEGATIVE: 1
RESPIRATORY NEGATIVE: 1
NEUROLOGICAL NEGATIVE: 1
WOUND: 1
CONSTITUTIONAL NEGATIVE: 1

## 2023-07-18 ASSESSMENT — ACTIVITIES OF DAILY LIVING (ADL): ADLS_ACUITY_SCORE: 35

## 2023-07-19 NOTE — ED PROVIDER NOTES
History     Chief Complaint   Patient presents with    Fall     HPI  Rubén Lane is a 89 year old male with a past medical history of chronic systolic heart failure, atherosclerotic heart disease, elevated PSA, hypertension, hyperlipidemia, paroxysmal atrial fibrillation on chronic anticoagulation (Xarelto), primary osteoarthritis, irregular heart rhythm with ICD in place who presents for evaluation of a laceration to the right arm and right knee following a fall which occurred in his garage this afternoon.  States that he got up out of his vehicle in his garage this afternoon.  His left foot stepped on his right foot sandal, causing him to fall.  He laid on the ground for a while before being discovered by a neighbor.  Denies hitting his head or neck.  States that he fell to the ground, striking his right knee hard.  When he rolled over, he discovered blood in his right forearm as well.  Wounds were cleaned at home and bandaged, bacitracin was applied.  The patient had acetaminophen prior to arrival in clinic today.  Denies any symptoms consistent with concussion or TBI.  Last Tdap vaccine was in 2014.    Allergies:  No Known Allergies    Problem List:    Patient Active Problem List    Diagnosis Date Noted    Depression, unspecified depression type 06/13/2022     Priority: Medium    Elevated prostate specific antigen (PSA) 04/20/2021     Priority: Medium     Formatting of this note might be different from the original.  Prostate Bx 5/8/02 and October 2002  ; Elevated PSA      Generalized osteoarthrosis 04/20/2021     Priority: Medium     Formatting of this note might be different from the original.  DJD and osteopenia of spine      Atherosclerotic heart disease of native coronary artery without angina pectoris 04/05/2021     Priority: Medium     Formatting of this note might be different from the original.  S/p MI 1985, s/p IWMI 10/97, s/p RCA stent and PCA 1997, s/p IWMI with PTCA of the RCA 11/98. S/p CABG X 3   4/01. Angiogram March 2004 shows occluded venous graft to RCA, other grafts patent.  Epic      Hyperlipidemia, unspecified 04/05/2021     Priority: Medium    Primary osteoarthritis of right knee 02/28/2020     Priority: Medium     R knee replaced. 2020      Hemorrhoids, internal 11/28/2018     Priority: Medium    Chronic systolic heart failure (H) 10/05/2017     Priority: Medium     30% EF.  Biventricular pacer, ICD.  Moderate tricuspid and mitral regurg.        Essential (primary) hypertension 10/05/2017     Priority: Medium    Ischemic cardiomyopathy 08/31/2017     Priority: Medium     30% EF with mod tricuspid and mitral regurg.        Anticoagulated 11/19/2015     Priority: Medium     Formatting of this note might be different from the original.  DOAC Anticoagulation Careplan for Rubén Lane    Medication: Rivaroxaban/Xarelto  Diagnoses: Atrial Fibrillation, Mural thrombus  Initiation date (AKA date when started on DOAC medication ): 12/9/2014.    Length of treatment: Indefinite.  Comments: Prescribed by Dr Maguire. See's cardiology    ASA 81mg qd    Erica Mcgowan RN      Paroxysmal atrial fibrillation (H) 03/31/2015     Priority: Medium     Sotalol and eliquis.  Cardiology.  Metoprolol low dose too.        Presence of automatic (implantable) cardiac defibrillator 07/10/2009     Priority: Medium        Past Medical History:    Past Medical History:   Diagnosis Date    Squamous cell carcinoma of skin, unspecified        Past Surgical History:    No past surgical history on file.    Family History:    No family history on file.    Social History:  Marital Status:   [2]  Social History     Tobacco Use    Smoking status: Former    Smokeless tobacco: Never        Medications:    acetaminophen (TYLENOL) 500 MG tablet  albuterol (PROAIR HFA/PROVENTIL HFA/VENTOLIN HFA) 108 (90 Base) MCG/ACT inhaler  apixaban ANTICOAGULANT (ELIQUIS) 5 MG tablet  aspirin (ASA) 81 MG EC tablet  atorvastatin (LIPITOR) 80 MG  tablet  Ferrous Gluconate-C-Folic Acid (IRON-C PO)  FLUoxetine (PROZAC) 20 MG capsule  furosemide (LASIX) 20 MG tablet  lisinopril (ZESTRIL) 40 MG tablet  metoprolol tartrate (LOPRESSOR) 25 MG tablet  nitroGLYcerin (NITROSTAT) 0.4 MG sublingual tablet  rivaroxaban ANTICOAGULANT (XARELTO) 20 MG TABS tablet  rosuvastatin (CRESTOR) 40 MG tablet  sotalol (BETAPACE) 80 MG tablet  sulfamethoxazole-trimethoprim (BACTRIM DS) 800-160 MG tablet        Review of Systems   Constitutional: Negative.    HENT: Negative.     Respiratory: Negative.     Cardiovascular: Negative.    Skin:  Positive for wound.   Neurological: Negative.        Physical Exam   BP: (!) 76/56 (states that is a regular blood pressure for pt)  Pulse: 71  Temp: 97.7  F (36.5  C)  Resp: 20  SpO2: 95 %  Lying Orthostatic BP: 105/60  Lying Orthostatic Pulse: 77 bpm  Sitting Orthostatic BP: 101/61  Sitting Orthostatic Pulse: 73 bpm  Standing Orthostatic BP: 105/59  Standing Orthostatic Pulse: 73 bpm      Physical Exam  Vitals reviewed.   Constitutional:       General: He is not in acute distress.     Appearance: Normal appearance. He is not ill-appearing, toxic-appearing or diaphoretic.   HENT:      Head: Normocephalic and atraumatic. No right periorbital erythema or left periorbital erythema.      Right Ear: Ear canal and external ear normal. No drainage, swelling or tenderness. No middle ear effusion. No mastoid tenderness.      Left Ear: Ear canal and external ear normal. No drainage, swelling or tenderness.  No middle ear effusion. No mastoid tenderness.      Nose: Nose normal. No signs of injury, laceration or nasal tenderness.      Mouth/Throat:      Mouth: Mucous membranes are moist. No injury.      Pharynx: Oropharynx is clear. No oropharyngeal exudate or posterior oropharyngeal erythema.   Eyes:      General: Vision grossly intact. No visual field deficit.     Extraocular Movements: Extraocular movements intact.      Right eye: Normal extraocular motion  and no nystagmus.      Left eye: Normal extraocular motion and no nystagmus.      Pupils: Pupils are equal, round, and reactive to light. Pupils are equal.      Right eye: Pupil is round, reactive and not sluggish.      Left eye: Pupil is round, reactive and not sluggish.      Funduscopic exam:     Right eye: Red reflex present.         Left eye: Red reflex present.  Cardiovascular:      Rate and Rhythm: Normal rate and regular rhythm.      Pulses: Normal pulses.   Pulmonary:      Effort: Pulmonary effort is normal. No respiratory distress.      Breath sounds: Normal breath sounds. No wheezing or rales.   Abdominal:      General: Abdomen is flat.      Palpations: Abdomen is soft.      Tenderness: There is no abdominal tenderness.   Musculoskeletal:         General: No swelling or tenderness.      Cervical back: Normal range of motion and neck supple. No rigidity or tenderness.   Lymphadenopathy:      Cervical: No cervical adenopathy.   Skin:     General: Skin is warm and dry.      Findings: Laceration present. No rash.   Neurological:      General: No focal deficit present.      Mental Status: He is alert and oriented to person, place, and time.      GCS: GCS eye subscore is 4. GCS verbal subscore is 5. GCS motor subscore is 6.      Cranial Nerves: No cranial nerve deficit or facial asymmetry.      Sensory: No sensory deficit.      Motor: Motor function is intact. No weakness or seizure activity.      Gait: Gait is intact.      Deep Tendon Reflexes: Reflexes normal.      Reflex Scores:       Bicep reflexes are 2+ on the right side and 2+ on the left side.       Patellar reflexes are 2+ on the right side and 2+ on the left side.        [facility]    -Laceration Repair    Date/Time: 7/18/2023 9:01 PM    Performed by: Shekhar Toth PA-C  Authorized by: Shekhar Toth PA-C    Risks, benefits and alternatives discussed.      ANESTHESIA (see MAR for  exact dosages):     Anesthesia method:  None  LACERATION DETAILS     Location:  Shoulder/arm    Shoulder/arm location:  R lower arm    Length (cm):  6 (loose skin with skin flap, skin edges can be approximated)    Depth (mm):  0    REPAIR TYPE:     Repair type:  Simple    EXPLORATION:     Wound exploration: wound explored through full range of motion and entire depth of wound probed and visualized      Wound extent: areolar tissue not violated, fascia not violated, no foreign body, no signs of injury, no nerve damage, no tendon damage, no underlying fracture and no vascular damage      Contaminated: no      TREATMENT:     Area cleansed with:  Hibiclens    Amount of cleaning:  Standard    Irrigation solution:  Sterile water    Irrigation volume:  150    Irrigation method:  Pressure wash and syringe    Visualized foreign bodies/material removed: no      SKIN REPAIR     Repair method:  Steri-Strips    APPROXIMATION     Approximation:  Close         Results for orders placed or performed during the hospital encounter of 07/18/23 (from the past 24 hour(s))   XR Knee Right 3 Views    Narrative    EXAM: XR KNEE RIGHT 3 VIEWS  LOCATION: [facility]  DATE: 7/18/2023    INDICATION: anterior right knee pain after a fall  COMPARISON: None      Impression    IMPRESSION: Osseous body. No acute fracture or dislocation. No signs of loosening. Minimal effusion.       Medications   oxyCODONE (ROXICODONE) tablet 5 mg (5 mg Oral $Given 7/18/23 2052)       Assessments & Plan (with Medical Decision Making)     The patient is a 89 year old male with a past medical history of chronic systolic heart failure, atherosclerotic heart disease, elevated PSA, hypertension, hyperlipidemia, paroxysmal atrial fibrillation on chronic anticoagulation (Xarelto), primary osteoarthritis, irregular heart rhythm with ICD in place who presents for evaluation of a laceration to the right arm and right knee following a fall which  occurred in his garage this afternoon.     Has abrasion injuries on the right knee and right forearm.  Abrasion injury of the right forearm repaired as described above, repaired with Steri-Strips.  Abrasion injury on the right knee was cleaned with sterile water and Hibiclens, dressed with bacitracin and nonstick dressing.    No neurologic deficits on exam today.  However considering he fell in the garage and struck his head on the garage floor, albeit only slightly per his report, recommend further evaluation and management in the emergency department to include CT scanning of the head and neck, EKG and basic labs.  Discussed that these precautions are appropriate considering the patient takes blood thinners.    I have reviewed the nursing notes.    I have reviewed the findings, diagnosis, plan and need for follow up with the patient.      New Prescriptions    No medications on file       Final diagnoses:   Abrasion of right knee, initial encounter   Abrasion of right forearm, initial encounter   Personal history of fall       7/18/2023   [facility] EMERGENCY DEPT       Shekhar Toth PA-C  07/18/23 [id number]

## 2023-07-19 NOTE — DISCHARGE INSTRUCTIONS
ICD-10-CM    1. Abrasion of right knee, initial encounter  S80.211A     follow-up clinic for recheck.  take tylenol 650 mg every 6 hours.  ice to  the areaon for  20 min/hour.  elevate.  use cane to unweight the leg.  consider lidocaine 4% patch to unbroken skin at knee on for 12 of every  24 hours. you discussed pain meds with Shekhar and I have therefore prescribed oxycodoen only for breakthrough pain.  avoid this med unless needed - jesusita.  for pain interfering with sleep.  this med can result in additional falls.       2. Abrasion of right forearm, initial encounter  S50.811A       3. Personal history of fall  Z91.81     exercise caution to  prevent falls.  we discussed obtaining labs to exclude underlying cause for fall (e.g. anemia or low potassium).  these tests were declined but reconsider these  on follow-up.      4. Closed head injury, initial encounter  S09.90XA     no signs of intracranial bleeding. return for changes in thinking, weakness      5. Anticoagulated  Z79.01

## 2023-07-19 NOTE — ED TRIAGE NOTES
Pt reports falling today in the garage   Landing on right knee and arm.   Pt states he is on Xarelto medication    pt states he had tylenol around 1730

## 2023-07-19 NOTE — ED PROVIDER NOTES
History     Chief Complaint   Patient presents with     Fall     HPI  Rubén Lane is a 89 year old male who presents with a history of hypertension paroxysmal A-fib, ischemic cardiomyopathy - on rivaroxaban, rosuvastatin and sotalol furosemide aspirin.   He was seen in urgent care after having a fall when he was getting out of his vehicle in his garage this afternoon he described what could be mechanical his left foot stepped on the right foot sandal causing him to fall.  He required assistance from a neighbor who helped him rise.  He had struck his right knee and that was his initial focus in addition to an abrasion on the right arm.  He ultimately was seen in urgent care.  He had initially denied any head injury or headache or other associated neurologic changes.  However during that eval he noted that as if he had fallen he might of grazed his head and therefore the provider Shekhar lopez who saw him -had consulted with me and I recommended that we do perform head imaging.  The patient agreed and came over to the emergency department for this.  Please see Shekhar's note regarding his evaluation and repair of laceration that was a skin tear on the right arm and as well as treatment of the knee.  That was performed before I saw him.  My focus was on his other associated injuries.  He denied any neck pain.  He denied headache.  There is no loss of consciousness.  He had no obvious associated weakness.  He had pain at his right knee that he discussed with Shekhar about obtaining a stronger pain medication due to the pain being refractory to Tylenol that he taken at home.  He denied any chest pain shortness of breath palpitations before the fall no lightheadedness near syncope.  Denied any focal weakness.  No recent fever or systemic symptoms.    Allergies:  No Known Allergies    Problem List:    Patient Active Problem List    Diagnosis Date Noted     Depression, unspecified depression type 06/13/2022     Priority: Medium      Elevated prostate specific antigen (PSA) 04/20/2021     Priority: Medium     Formatting of this note might be different from the original.  Prostate Bx 5/8/02 and October 2002  ; Elevated PSA       Generalized osteoarthrosis 04/20/2021     Priority: Medium     Formatting of this note might be different from the original.  DJD and osteopenia of spine       Atherosclerotic heart disease of native coronary artery without angina pectoris 04/05/2021     Priority: Medium     Formatting of this note might be different from the original.  S/p MI 1985, s/p IWMI 10/97, s/p RCA stent and PCA 1997, s/p IWMI with PTCA of the RCA 11/98. S/p CABG X 3  4/01. Angiogram March 2004 shows occluded venous graft to RCA, other grafts patent.  Epic       Hyperlipidemia, unspecified 04/05/2021     Priority: Medium     Primary osteoarthritis of right knee 02/28/2020     Priority: Medium     R knee replaced. 2020       Hemorrhoids, internal 11/28/2018     Priority: Medium     Chronic systolic heart failure (H) 10/05/2017     Priority: Medium     30% EF.  Biventricular pacer, ICD.  Moderate tricuspid and mitral regurg.         Essential (primary) hypertension 10/05/2017     Priority: Medium     Ischemic cardiomyopathy 08/31/2017     Priority: Medium     30% EF with mod tricuspid and mitral regurg.         Anticoagulated 11/19/2015     Priority: Medium     Formatting of this note might be different from the original.  DOAC Anticoagulation Careplan for Rubén Lane    Medication: Rivaroxaban/Xarelto  Diagnoses: Atrial Fibrillation, Mural thrombus  Initiation date (AKA date when started on DOAC medication ): 12/9/2014.    Length of treatment: Indefinite.  Comments: Prescribed by Dr Maguire. See's cardiology    ASA 81mg qd    Erica Mcgowan RN       Paroxysmal atrial fibrillation (H) 03/31/2015     Priority: Medium     Sotalol and eliquis.  Cardiology.  Metoprolol low dose too.         Presence of automatic (implantable) cardiac defibrillator  07/10/2009     Priority: Medium        Past Medical History:    Past Medical History:   Diagnosis Date     Squamous cell carcinoma of skin, unspecified        Past Surgical History:    No past surgical history on file.    Family History:    No family history on file.    Social History:  Marital Status:   [2]  Social History     Tobacco Use     Smoking status: Former     Smokeless tobacco: Never        Medications:    oxyCODONE (ROXICODONE) 5 MG tablet  rivaroxaban ANTICOAGULANT (XARELTO) 20 MG TABS tablet  acetaminophen (TYLENOL) 500 MG tablet  albuterol (PROAIR HFA/PROVENTIL HFA/VENTOLIN HFA) 108 (90 Base) MCG/ACT inhaler  apixaban ANTICOAGULANT (ELIQUIS) 5 MG tablet  aspirin (ASA) 81 MG EC tablet  atorvastatin (LIPITOR) 80 MG tablet  Ferrous Gluconate-C-Folic Acid (IRON-C PO)  FLUoxetine (PROZAC) 20 MG capsule  furosemide (LASIX) 20 MG tablet  lisinopril (ZESTRIL) 40 MG tablet  metoprolol tartrate (LOPRESSOR) 25 MG tablet  nitroGLYcerin (NITROSTAT) 0.4 MG sublingual tablet  rosuvastatin (CRESTOR) 40 MG tablet  sotalol (BETAPACE) 80 MG tablet  sulfamethoxazole-trimethoprim (BACTRIM DS) 800-160 MG tablet          Review of Systems    ROS:  5 point ROS negative except as noted above in HPI, including Gen., Resp., CV, GI &  system review.    Physical Exam   BP: (!) 76/56 (states that is a regular blood pressure for pt)  Pulse: 71  Temp: 97.7  F (36.5  C)  Resp: 20  SpO2: 95 %  Lying Orthostatic BP: 105/60  Lying Orthostatic Pulse: 77 bpm  Sitting Orthostatic BP: 101/61  Sitting Orthostatic Pulse: 73 bpm  Standing Orthostatic BP: 105/59  Standing Orthostatic Pulse: 73 bpm      Physical Exam  Constitutional:       General: He is in acute distress.      Appearance: He is not diaphoretic.   HENT:      Head: Atraumatic.   Eyes:      Conjunctiva/sclera: Conjunctivae normal.   Cardiovascular:      Rate and Rhythm: Normal rate and regular rhythm.      Heart sounds: No murmur heard.  Pulmonary:      Effort: Pulmonary  effort is normal. No respiratory distress.      Breath sounds: Normal breath sounds. No stridor. No wheezing or rhonchi.   Abdominal:      General: Abdomen is flat. There is no distension.      Palpations: Abdomen is soft. There is no mass.      Tenderness: There is no abdominal tenderness. There is no guarding.   Musculoskeletal:      Cervical back: Neck supple.      Right lower leg: No edema.      Left lower leg: No edema.   Skin:     Coloration: Skin is not pale.      Findings: No rash.   Neurological:      General: No focal deficit present.      Mental Status: He is alert and oriented to person, place, and time.      Cranial Nerves: No cranial nerve deficit.      Sensory: No sensory deficit.      Motor: No weakness.      Coordination: Coordination normal.            The head is atraumatic.  There is no drainage from ears or nose.  No epistaxis.  No raccoon's eyes or mesa sign.  Nontender cervical spine midline.  Full range of motion.  Cranial nerves fully intact.     ED Course                 Procedures                EKG Interpretation:      Interpreted by Dov Parry MD  EKG done at 0948 hrs. demonstrates a paced rhythm at 71 bpm with a left axis.  There is no consistent ST change.  There is T wave inversion in leads V3 through V6.  There is T wave flattening in the inferior leads.  There is a poor R progression V1 through V3.  No ectopy.  Normal conduction.  Impression paced atrial rhythm 71 bpm with no significant acute ST changes with T wave inversion laterally and inferiorly.  No comparison EKG.  QRS borderline with 114.  First-degree AV block    Critical Care time:  none               Results for orders placed or performed during the hospital encounter of 07/18/23 (from the past 24 hour(s))   XR Knee Right 3 Views    Narrative    EXAM: XR KNEE RIGHT 3 VIEWS  LOCATION: [facility]  DATE: 7/18/2023    INDICATION: anterior right knee pain after a fall  COMPARISON: None       Impression    IMPRESSION: Osseous body. No acute fracture or dislocation. No signs of loosening. Minimal effusion.   -Laceration Repair    Narrative    Shekhar Toth PA-C     7/18/2023  9:45 PM  [facility]    -Laceration Repair    Date/Time: 7/18/2023 9:01 PM    Performed by: Shekhar Toth PA-C  Authorized by: Shekhar Toth PA-C    Risks, benefits and alternatives discussed.      ANESTHESIA (see MAR for exact dosages):     Anesthesia method:  None  LACERATION DETAILS     Location:  Shoulder/arm    Shoulder/arm location:  R lower arm    Length (cm):  6 (loose skin with skin flap, skin edges can be   approximated)    Depth (mm):  0    REPAIR TYPE:     Repair type:  Simple    EXPLORATION:     Wound exploration: wound explored through full range of motion and   entire depth of wound probed and visualized      Wound extent: areolar tissue not violated, fascia not violated, no   foreign body, no signs of injury, no nerve damage, no tendon damage, no   underlying fracture and no vascular damage      Contaminated: no      TREATMENT:     Area cleansed with:  Hibiclens    Amount of cleaning:  Standard    Irrigation solution:  Sterile water    Irrigation volume:  150    Irrigation method:  Pressure wash and syringe    Visualized foreign bodies/material removed: no      SKIN REPAIR     Repair method:  Steri-Strips    APPROXIMATION     Approximation:  Close   Cervical spine CT w/o contrast    Narrative    EXAM: CT HEAD W/O CONTRAST, CT CERVICAL SPINE W/O CONTRAST  LOCATION: [facility]  DATE: 7/18/2023    INDICATION: Xarelto. Head injury.  COMPARISON: None.  TECHNIQUE:   1) Routine CT Head without IV contrast. Multiplanar reformats. Dose reduction techniques were used.  2) Routine CT Cervical Spine without IV contrast. Multiplanar reformats. Dose reduction techniques were used.    FINDINGS:   HEAD CT:   INTRACRANIAL CONTENTS: No intracranial  hemorrhage, extraaxial collection, or mass effect.  No CT evidence of acute infarct. Mild to moderate presumed chronic small vessel ischemic changes. Moderate generalized volume loss. No hydrocephalus. Note is made   of coarse atherosclerotic calcifications of the intracranial vasculature.     VISUALIZED ORBITS/SINUSES/MASTOIDS: Prior bilateral cataract surgery. Visualized portions of the orbits are otherwise unremarkable. No paranasal sinus mucosal disease. No middle ear or mastoid effusion.     BONES/SOFT TISSUES: No acute abnormality.    CERVICAL SPINE CT:   VERTEBRA: Decreased osseous mineralization. Levoconvex curvature of the cervical spine. Additionally, there is levoconvex curvature of the upper thoracic spine, incompletely imaged.  Anterolisthesis at the level of C7 on T1 is favored to be   chronic/degenerative. No acute fracture or posttraumatic subluxation.    CANAL/FORAMINA: Multilevel degenerative changes. No high-grade central spinal canal stenosis. At C3-C4, there is mild bilateral neural foraminal stenosis. At C4-C5, there is moderate right and mild to moderate left neural foraminal stenosis. At C5-C6,   there is severe left and mild right neural foraminal stenosis. At C6-C7, there is mild to moderate left neural foraminal stenosis.    PARASPINAL: Tiny right apical pulmonary granuloma. Mild biapical fibrotic changes are noted. A small region of emphysematous change is noted involving the dependent portion of the right upper lobe with associated surrounding fibrosis/scarring.      Impression    IMPRESSION:  HEAD CT:  1.  No CT evidence for acute intracranial process.  2.  Brain atrophy and presumed chronic microvascular ischemic changes as above.    CERVICAL SPINE CT:  1.  No CT evidence for acute fracture or post traumatic subluxation.  2.  Additional findings above.   Head CT w/o contrast    Narrative    EXAM: CT HEAD W/O CONTRAST, CT CERVICAL SPINE W/O CONTRAST  LOCATION: [facility]  DATE: 7/18/2023    INDICATION: Xarelto. Head injury.  COMPARISON: None.  TECHNIQUE:   1) Routine CT Head without IV contrast. Multiplanar reformats. Dose reduction techniques were used.  2) Routine CT Cervical Spine without IV contrast. Multiplanar reformats. Dose reduction techniques were used.    FINDINGS:   HEAD CT:   INTRACRANIAL CONTENTS: No intracranial hemorrhage, extraaxial collection, or mass effect.  No CT evidence of acute infarct. Mild to moderate presumed chronic small vessel ischemic changes. Moderate generalized volume loss. No hydrocephalus. Note is made   of coarse atherosclerotic calcifications of the intracranial vasculature.     VISUALIZED ORBITS/SINUSES/MASTOIDS: Prior bilateral cataract surgery. Visualized portions of the orbits are otherwise unremarkable. No paranasal sinus mucosal disease. No middle ear or mastoid effusion.     BONES/SOFT TISSUES: No acute abnormality.    CERVICAL SPINE CT:   VERTEBRA: Decreased osseous mineralization. Levoconvex curvature of the cervical spine. Additionally, there is levoconvex curvature of the upper thoracic spine, incompletely imaged.  Anterolisthesis at the level of C7 on T1 is favored to be   chronic/degenerative. No acute fracture or posttraumatic subluxation.    CANAL/FORAMINA: Multilevel degenerative changes. No high-grade central spinal canal stenosis. At C3-C4, there is mild bilateral neural foraminal stenosis. At C4-C5, there is moderate right and mild to moderate left neural foraminal stenosis. At C5-C6,   there is severe left and mild right neural foraminal stenosis. At C6-C7, there is mild to moderate left neural foraminal stenosis.    PARASPINAL: Tiny right apical pulmonary granuloma. Mild biapical fibrotic changes are noted. A small region of emphysematous change is noted involving the dependent portion of the right upper lobe with associated surrounding fibrosis/scarring.      Impression    IMPRESSION:  HEAD CT:  1.  No CT  evidence for acute intracranial process.  2.  Brain atrophy and presumed chronic microvascular ischemic changes as above.    CERVICAL SPINE CT:  1.  No CT evidence for acute fracture or post traumatic subluxation.  2.  Additional findings above.       Medications   Tdap (tetanus-diphtheria-acell pertussis) (ADACEL) injection 0.5 mL (has no administration in time range)   oxyCODONE (ROXICODONE) tablet 5 mg (5 mg Oral $Given 7/18/23 2052)       Assessments & Plan (with Medical Decision Making)     MDM: Rubén Lane is a 89 year old male who presents with a fall that occurred in his garage.  On several medications for cardiomyopathy and atrial fibrillation including chronic anticoagulation and diuretics sotalol.  Presents here in the emergency department after noting possible head injury on anticoagulation while in urgent care.  Evaluated for his injury was initially there requiring x-ray including a right arm abrasion and a right knee contusion.  X-rays negative and I did review all of those images to confirm no loosening of his hardware that he has from her replacement of the knee.  No obvious fracture.  I did not address the abrasions and laceration as that was repaired in urgent care his tetanus was updated here in the emergency department.  My focus was on the head injury related to his fall.  I see no obvious intracranial bleeding on CT and this was over read.  His CT of his cervical spine certainly shows significant degeneration and a severe lordosis but no acute changes.  Normal neurologic exam.  I recommended that we also perform laboratory testing related to the fall and underlying risks to evaluate for any possible syncopal cause.  This would have included hypokalemia and anemia but he declines this.  Recommended this for reconsideration at home.  This time he appears to be stable for home.  His EKG demonstrates no significant acute changes.    I have reviewed the nursing notes.    I have reviewed the  findings, diagnosis, plan and need for follow up with the patient.           Medical Decision Making  The patient's presentation was of moderate complexity (an acute complicated injury).    The patient's evaluation involved:  ordering and/or review of 3+ test(s) in this encounter (see separate area of note for details)    The patient's management necessitated only low risk treatment.        New Prescriptions    OXYCODONE (ROXICODONE) 5 MG TABLET    Take 1 tablet (5 mg) by mouth every 6 hours as needed for severe pain       Final diagnoses:   Abrasion of right knee, initial encounter - follow-up clinic for recheck.  take tylenol 650 mg every 6 hours.  ice to  the areaon for  20 min/hour.  elevate.  use cane to unweight the leg.  consider lidocaine 4% patch to unbroken skin at knee on for 12 of every  24 hours. you discussed pain meds with Shekhar and I have therefore prescribed oxycodoen only for breakthrough pain.  avoid this med unless needed - jesusita.  for pain interfering with sleep.  this med can result in additional falls.    Abrasion of right forearm, initial encounter   Personal history of fall - exercise caution to  prevent falls.  we discussed obtaining labs to exclude underlying cause for fall (e.g. anemia or low potassium).  these tests were declined but reconsider these  on follow-up.   Closed head injury, initial encounter - no signs of intracranial bleeding. return for changes in thinking, weakness   Anticoagulated       7/18/2023   [facility] EMERGENCY DEPT     Dov Parry MD  07/18/23 1606

## 2023-09-07 ENCOUNTER — OFFICE VISIT (OUTPATIENT)
Dept: FAMILY MEDICINE | Facility: CLINIC | Age: 88
End: 2023-09-07
Payer: COMMERCIAL

## 2023-09-07 VITALS
OXYGEN SATURATION: 97 % | HEART RATE: 87 BPM | RESPIRATION RATE: 16 BRPM | HEIGHT: 66 IN | SYSTOLIC BLOOD PRESSURE: 118 MMHG | WEIGHT: 176 LBS | DIASTOLIC BLOOD PRESSURE: 74 MMHG | BODY MASS INDEX: 28.28 KG/M2 | TEMPERATURE: 97.6 F

## 2023-09-07 DIAGNOSIS — I48.0 PAROXYSMAL ATRIAL FIBRILLATION (H): ICD-10-CM

## 2023-09-07 DIAGNOSIS — I10 ESSENTIAL (PRIMARY) HYPERTENSION: Primary | ICD-10-CM

## 2023-09-07 DIAGNOSIS — K25.4 GASTROINTESTINAL HEMORRHAGE ASSOCIATED WITH GASTRIC ULCER: ICD-10-CM

## 2023-09-07 PROCEDURE — 99214 OFFICE O/P EST MOD 30 MIN: CPT | Performed by: FAMILY MEDICINE

## 2023-09-07 RX ORDER — OMEPRAZOLE 40 MG/1
40 CAPSULE, DELAYED RELEASE ORAL
COMMUNITY
Start: 2023-08-29 | End: 2023-10-29

## 2023-09-07 ASSESSMENT — ANXIETY QUESTIONNAIRES
IF YOU CHECKED OFF ANY PROBLEMS ON THIS QUESTIONNAIRE, HOW DIFFICULT HAVE THESE PROBLEMS MADE IT FOR YOU TO DO YOUR WORK, TAKE CARE OF THINGS AT HOME, OR GET ALONG WITH OTHER PEOPLE: NOT DIFFICULT AT ALL
5. BEING SO RESTLESS THAT IT IS HARD TO SIT STILL: NOT AT ALL
4. TROUBLE RELAXING: NOT AT ALL
2. NOT BEING ABLE TO STOP OR CONTROL WORRYING: NOT AT ALL
3. WORRYING TOO MUCH ABOUT DIFFERENT THINGS: NOT AT ALL
7. FEELING AFRAID AS IF SOMETHING AWFUL MIGHT HAPPEN: NOT AT ALL
GAD7 TOTAL SCORE: 0
1. FEELING NERVOUS, ANXIOUS, OR ON EDGE: NOT AT ALL
6. BECOMING EASILY ANNOYED OR IRRITABLE: NOT AT ALL
GAD7 TOTAL SCORE: 0

## 2023-09-07 ASSESSMENT — PAIN SCALES - GENERAL: PAINLEVEL: NO PAIN (0)

## 2023-09-07 ASSESSMENT — PATIENT HEALTH QUESTIONNAIRE - PHQ9
SUM OF ALL RESPONSES TO PHQ QUESTIONS 1-9: 3
SUM OF ALL RESPONSES TO PHQ QUESTIONS 1-9: 3
10. IF YOU CHECKED OFF ANY PROBLEMS, HOW DIFFICULT HAVE THESE PROBLEMS MADE IT FOR YOU TO DO YOUR WORK, TAKE CARE OF THINGS AT HOME, OR GET ALONG WITH OTHER PEOPLE: NOT DIFFICULT AT ALL

## 2023-09-07 NOTE — PROGRESS NOTES
"      Lainey Montana is a 89 year old, presenting for the following health issues:  Hospital F/U      9/7/2023    10:31 AM   Additional Questions   Roomed by Jayne   Accompanied by wife and daughter - Ria CENTENO       Hospital Follow-up Visit:    Hospital/Nursing Home/IP Rehab Facility:  [facility]  Date of Admission: 8/27/2023  Date of Discharge: 8/30/2023  Reason(s) for Admission: confusion, low hgb     Was your hospitalization related to COVID-19? No   Problems taking medications regularly:  None  Medication changes since discharge: None  Problems adhering to non-medication therapy:  None    Summary of hospitalization:  [facility] discharge summary reviewed  Diagnostic Tests/Treatments reviewed.  Follow up needed: none  Other Healthcare Providers Involved in Patient s Care:         None  Update since discharge: improved.    Plan of care communicated with patient and family       GI bleed , upper.  On PPI double dose.  Feeling better.  No sob or dizziness  Afib: on xarelto.  Has had falls at home, in addition to GI bleed.    Stopping anticoag most likely  Htn: off lisionpril.  Off metoprolol.  BP running OK at home.      Wants to be on as few meds as possible.  Family agrees.        Objective    /74 (BP Location: Right arm)   Pulse 87   Temp 97.6  F (36.4  C) (Tympanic)   Resp 16   Ht 1.676 m (5' 6\")   Wt 79.8 kg (176 lb)   SpO2 97%   BMI 28.41 kg/m    Body mass index is 28.41 kg/m .  Physical Exam   GEN: Alert and oriented, in no acute distress  RRR, no murmur  EXT: no edema or lesions noted in lower extremities    A: afib, intermittent       GI bleed, improving        Htn, stable     P: off xarelto.  Off lisionpril.  Off metoprolol.  40mg daily on prilosec, likely long term.    Continue prozac, crestor., sotalol.    Back in 6 weeks.  Will likely need some refills.  Restart low dose metoprolol?  Will see    They understand stroke risk is higher with just baby ASA daily instead " [facility].                  Answers submitted by the patient for this visit:  Patient Health Questionnaire (Submitted on 9/7/2023)  If you checked off any problems, how difficult have these problems made it for you to do your work, take care of things at home, or get along with other people?: Not difficult at all  PHQ9 TOTAL SCORE: 3  MILANA-7 (Submitted on 9/7/2023)  MILANA 7 TOTAL SCORE: 0

## 2023-09-18 ENCOUNTER — OFFICE VISIT (OUTPATIENT)
Dept: URGENT CARE | Facility: URGENT CARE | Age: 88
End: 2023-09-18
Payer: COMMERCIAL

## 2023-09-18 VITALS
RESPIRATION RATE: 16 BRPM | BODY MASS INDEX: 28.57 KG/M2 | OXYGEN SATURATION: 97 % | HEART RATE: 86 BPM | SYSTOLIC BLOOD PRESSURE: 120 MMHG | WEIGHT: 177 LBS | TEMPERATURE: 97.8 F | DIASTOLIC BLOOD PRESSURE: 77 MMHG

## 2023-09-18 DIAGNOSIS — R30.0 DYSURIA: Primary | ICD-10-CM

## 2023-09-18 LAB
ALBUMIN UR-MCNC: NEGATIVE MG/DL
APPEARANCE UR: CLEAR
BILIRUB UR QL STRIP: NEGATIVE
COLOR UR AUTO: YELLOW
GLUCOSE UR STRIP-MCNC: NEGATIVE MG/DL
HGB UR QL STRIP: ABNORMAL
KETONES UR STRIP-MCNC: NEGATIVE MG/DL
LEUKOCYTE ESTERASE UR QL STRIP: NEGATIVE
MUCOUS THREADS #/AREA URNS LPF: PRESENT /LPF
NITRATE UR QL: NEGATIVE
PH UR STRIP: 7 [PH] (ref 5–7)
RBC #/AREA URNS AUTO: ABNORMAL /HPF
SP GR UR STRIP: 1.02 (ref 1–1.03)
UROBILINOGEN UR STRIP-ACNC: 0.2 E.U./DL
WBC #/AREA URNS AUTO: ABNORMAL /HPF

## 2023-09-18 PROCEDURE — 87086 URINE CULTURE/COLONY COUNT: CPT | Performed by: PHYSICIAN ASSISTANT

## 2023-09-18 PROCEDURE — 99214 OFFICE O/P EST MOD 30 MIN: CPT | Performed by: PHYSICIAN ASSISTANT

## 2023-09-18 PROCEDURE — 81001 URINALYSIS AUTO W/SCOPE: CPT | Performed by: PHYSICIAN ASSISTANT

## 2023-09-18 RX ORDER — SULFAMETHOXAZOLE/TRIMETHOPRIM 800-160 MG
1 TABLET ORAL 2 TIMES DAILY
Qty: 14 TABLET | Refills: 0 | Status: SHIPPED | OUTPATIENT
Start: 2023-09-18 | End: 2023-09-25

## 2023-09-18 NOTE — PROGRESS NOTES
"  Assessment & Plan     Dysuria  Culture pending. Continue to monitor symptoms. Gave written Rx for Bactrim that can be filled if symptoms worsen or do not improve.     - UA Macroscopic with reflex to Microscopic and Culture - Clinic Collect  - UA Microscopic with Reflex to Culture  - Urine Culture Aerobic Bacterial - lab collect; Future  - Urine Culture Aerobic Bacterial - lab collect  - sulfamethoxazole-trimethoprim (BACTRIM DS) 800-160 MG tablet; Take 1 tablet by mouth 2 times daily for 7 days             BMI:   Estimated body mass index is 28.57 kg/m  as calculated from the following:    Height as of 9/7/23: 1.676 m (5' 6\").    Weight as of this encounter: 80.3 kg (177 lb).           No follow-ups on file.    COLEEN Walsh [facility]            Subjective   Chief Complaint   Patient presents with    Urinary Problem     Pain with urination x 3 days.       HPI     UTI    Onset of symptoms was 3-4 day(s).  Course of illness is same  Severity moderate  Current and associated symptoms dysuria and suprapubic pressure   Treatment and measures tried None  Predisposing factors include none  Patient denies rigors, flank pain, and temperature > 101 degrees F.                  Review of Systems         Objective    /77   Pulse 86   Temp 97.8  F (36.6  C) (Tympanic)   Resp 16   Wt 80.3 kg (177 lb)   SpO2 97%   BMI 28.57 kg/m    Body mass index is 28.57 kg/m .    Physical Exam  Constitutional:       General: He is not in acute distress.  Neurological:      Mental Status: He is alert.   Psychiatric:         Speech: Speech normal.         Behavior: Behavior normal. Behavior is cooperative.            Results for orders placed or performed in visit on 09/18/23 (from the past 24 hour(s))   UA Macroscopic with reflex to Microscopic and Culture - Clinic Collect    Specimen: Urine, Clean Catch   Result Value Ref Range    Color Urine Yellow Colorless, Straw, Light Yellow, Yellow    " Appearance Urine Clear Clear    Glucose Urine Negative Negative mg/dL    Bilirubin Urine Negative Negative    Ketones Urine Negative Negative mg/dL    Specific Gravity Urine 1.020 1.003 - 1.035    Blood Urine Trace (A) Negative    pH Urine 7.0 5.0 - 7.0    Protein Albumin Urine Negative Negative mg/dL    Urobilinogen Urine 0.2 0.2, 1.0 E.U./dL    Nitrite Urine Negative Negative    Leukocyte Esterase Urine Negative Negative   UA Microscopic with Reflex to Culture   Result Value Ref Range    RBC Urine 0-2 0-2 /HPF /HPF    WBC Urine 0-5 0-5 /HPF /HPF    Mucus Urine Present (A) None Seen /LPF    Narrative    Urine Culture not indicated

## 2023-09-20 LAB — BACTERIA UR CULT: NO GROWTH

## 2023-09-29 ENCOUNTER — OFFICE VISIT (OUTPATIENT)
Dept: UROLOGY | Facility: CLINIC | Age: 88
End: 2023-09-29
Attending: PHYSICIAN ASSISTANT
Payer: COMMERCIAL

## 2023-09-29 VITALS
BODY MASS INDEX: 28.45 KG/M2 | WEIGHT: 177 LBS | HEIGHT: 66 IN | DIASTOLIC BLOOD PRESSURE: 77 MMHG | SYSTOLIC BLOOD PRESSURE: 122 MMHG | HEART RATE: 79 BPM | TEMPERATURE: 97.5 F | OXYGEN SATURATION: 97 %

## 2023-09-29 DIAGNOSIS — R31.0 GROSS HEMATURIA: ICD-10-CM

## 2023-09-29 LAB
ALBUMIN UR-MCNC: NEGATIVE MG/DL
APPEARANCE UR: CLEAR
BILIRUB UR QL STRIP: NEGATIVE
COLOR UR AUTO: YELLOW
GLUCOSE UR STRIP-MCNC: NEGATIVE MG/DL
HGB UR QL STRIP: NEGATIVE
KETONES UR STRIP-MCNC: NEGATIVE MG/DL
LEUKOCYTE ESTERASE UR QL STRIP: ABNORMAL
MUCOUS THREADS #/AREA URNS LPF: PRESENT /LPF
NITRATE UR QL: NEGATIVE
PH UR STRIP: 7 [PH] (ref 5–7)
RBC #/AREA URNS AUTO: ABNORMAL /HPF
SP GR UR STRIP: 1.01 (ref 1–1.03)
SQUAMOUS #/AREA URNS AUTO: ABNORMAL /LPF
UROBILINOGEN UR STRIP-ACNC: 1 E.U./DL
WBC #/AREA URNS AUTO: ABNORMAL /HPF

## 2023-09-29 PROCEDURE — 81001 URINALYSIS AUTO W/SCOPE: CPT | Performed by: STUDENT IN AN ORGANIZED HEALTH CARE EDUCATION/TRAINING PROGRAM

## 2023-09-29 PROCEDURE — 88112 CYTOPATH CELL ENHANCE TECH: CPT | Performed by: PATHOLOGY

## 2023-09-29 PROCEDURE — 51798 US URINE CAPACITY MEASURE: CPT | Performed by: STUDENT IN AN ORGANIZED HEALTH CARE EDUCATION/TRAINING PROGRAM

## 2023-09-29 PROCEDURE — 99212 OFFICE O/P EST SF 10 MIN: CPT | Mod: 25 | Performed by: STUDENT IN AN ORGANIZED HEALTH CARE EDUCATION/TRAINING PROGRAM

## 2023-09-29 ASSESSMENT — PAIN SCALES - GENERAL: PAINLEVEL: SEVERE PAIN (6)

## 2023-09-29 NOTE — NURSING NOTE
"Initial /77   Pulse 79   Temp 97.5  F (36.4  C) (Tympanic)   Ht 1.676 m (5' 6\")   Wt 80.3 kg (177 lb)   SpO2 97%   BMI 28.57 kg/m   Estimated body mass index is 28.57 kg/m  as calculated from the following:    Height as of this encounter: 1.676 m (5' 6\").    Weight as of this encounter: 80.3 kg (177 lb). .  Active order to obtain bladder scan? Yes   Name of ordering provider:  Lauren Aranda  Bladder scan preformed post void Yes.  Bladder scan reveled 122ML  Provider notified?  Yes    Gail Hagen CMA        "

## 2023-09-29 NOTE — LETTER
October 3, 2023      Rubén Lane  [address]  [facility] [address]        Dear ,    We are writing to inform you of your test results.    Your test results fall within the expected range(s) or remain unchanged from previous results.  Please keep your upcoming cystoscopy appointment with Dr. Osorio.     Resulted Orders   UA Macroscopic with reflex to Microscopic and Culture   Result Value Ref Range    Color Urine Yellow Colorless, Straw, Light Yellow, Yellow    Appearance Urine Clear Clear    Glucose Urine Negative Negative mg/dL    Bilirubin Urine Negative Negative    Ketones Urine Negative Negative mg/dL    Specific Gravity Urine 1.015 1.003 - 1.035    Blood Urine Negative Negative    pH Urine 7.0 5.0 - 7.0    Protein Albumin Urine Negative Negative mg/dL    Urobilinogen Urine 1.0 0.2, 1.0 E.U./dL    Nitrite Urine Negative Negative    Leukocyte Esterase Urine Trace (A) Negative   Cytology non gyn [[id number]]   Result Value Ref Range    Final Diagnosis       Specimen A     Interpretation:      Negative for High Grade Urothelial Carcinoma     Adequacy:     Satisfactory for evaluation          Clinical Information       Gross hematuria      Gross Description       A(A). Urine, Voided, :A. Urine, Voided, , Urine Cytology:  Received 80 ml of clear, yellow fluid, processed as 1 Pap stained Autocyte.                 Microscopic Description       Microscopic examination was performed.        Performing Labs       The technical component of this testing was completed at [facility]     UA Microscopic with Reflex to Culture   Result Value Ref Range    RBC Urine 0-2 0-2 /HPF /HPF    WBC Urine 0-5 0-5 /HPF /HPF    Squamous Epithelials Urine Few (A) None Seen /LPF    Mucus Urine Present (A) None Seen /LPF    Narrative    Urine Culture not indicated       If you have any questions or concerns, please call the clinic at the number listed above.        Sincerely,      Lauren Aranda PA-C

## 2023-09-29 NOTE — PROGRESS NOTES
"        Chief Complaint:   Gross hematuria         History of Present Illness:   Rubén Lane is a 89 year old male with a history of osteoarthritis, HLD, CAD, CHF, atrial fibrillation, and HTN who presents for evaluation of gross hematuria.     He reports a few episodes of gross hematuria in the last month. He currently denies any dysuria, pyuria, hesitancy, intermittency, feelings of incomplete emptying, or any recent history of urinary tract infections or stones.    He had a CT abdomen pelvis with contrast on 8/27/2023 which showed a 5 mm non-obstructing left mid renal stone and diffuse bladder wall thickening.     He quit smoking about 60 years ago. He was a heavy smoker.          Past Medical History:     Past Medical History:   Diagnosis Date    Squamous cell carcinoma of skin, unspecified             Past Surgical History:   No past surgical history on file.         Medications     Current Outpatient Medications   Medication    aspirin (ASA) 81 MG EC tablet    Ferrous Gluconate-C-Folic Acid (IRON-C PO)    FLUoxetine (PROZAC) 20 MG capsule    omeprazole (PRILOSEC) 40 MG DR capsule    rosuvastatin (CRESTOR) 40 MG tablet    sotalol (BETAPACE) 80 MG tablet    acetaminophen (TYLENOL) 500 MG tablet    albuterol (PROAIR HFA/PROVENTIL HFA/VENTOLIN HFA) 108 (90 Base) MCG/ACT inhaler    nitroGLYcerin (NITROSTAT) 0.4 MG sublingual tablet    oxyCODONE (ROXICODONE) 5 MG tablet     No current facility-administered medications for this visit.            Allergies:   Patient has no known allergies.         Review of Systems:  From intake questionnaire   Negative 14 system review except as noted on HPI, nurse's note.         Physical Exam:   Patient is a 89 year old  male   Vitals: Blood pressure 122/77, pulse 79, temperature 97.5  F (36.4  C), temperature source Tympanic, height 1.676 m (5' 6\"), weight 80.3 kg (177 lb), SpO2 97 %.  General Appearance Adult: Alert, no acute distress, oriented.  Lungs: Non-labored " breathing.  Heart: No obvious jugular venous distension present.  Neuro: Alert, oriented, speech and mentation normal    PVR: 122 mL      Labs and Pathology:    I personally reviewed all applicable laboratory data and went over findings with patient  Significant for:     BMP RESULTS:  Recent Labs   Lab Test 10/12/22  1431 10/16/21  1044 06/28/21  1250    147* 139   POTASSIUM 4.6 4.6 4.8   CHLORIDE 102 114* 107   CO2 30* 29 27   ANIONGAP 6* 4 5   * 98 96   BUN 10.1 11 7   CR 0.74 0.62* 0.66   GFRESTIMATED 87 89 86   GFRESTBLACK  --   --  >90   LIZ 8.6* 8.3* 8.7       UA RESULTS:   Recent Labs   Lab Test 09/18/23  1052 06/26/23  1029 06/13/22  1601   SG 1.020 1.010 1.025   URINEPH 7.0 6.5 6.0   NITRITE Negative Negative Negative   RBCU 0-2 0-2 0-2   WBCU 0-5 0-5 0-5            Assessment and Plan:     Assessment: Mr. Rubén Lane is a pleasant 89 year old male with gross hematuria that has since resolved.  He had a CT abdomen pelvis with contrast on 8/27/2023 which showed a 5 mm non-obstructing left mid renal stone and diffuse bladder wall thickening.     He quit smoking about 60 years ago. He was a heavy smoker.     Plan:  At this time, recommend proceeding with comprehensive hematuria evaluation to include:  - Urinalysis and urine culture to rule out an acute urinary tract infection.   - Urine cytology to look for cells concerning for malignancy.  - Cystoscopy with to evaluate the interior of the bladder. Follow up for hematuria as recommended by urologist performing cystoscopic evaluation.      JASKARAN HAMM PA-C  Department of Urology

## 2023-10-02 LAB
PATH REPORT.COMMENTS IMP SPEC: NORMAL
PATH REPORT.FINAL DX SPEC: NORMAL
PATH REPORT.GROSS SPEC: NORMAL
PATH REPORT.MICROSCOPIC SPEC OTHER STN: NORMAL
PATH REPORT.RELEVANT HX SPEC: NORMAL

## 2023-10-31 ENCOUNTER — OFFICE VISIT (OUTPATIENT)
Dept: UROLOGY | Facility: CLINIC | Age: 88
End: 2023-10-31
Payer: COMMERCIAL

## 2023-10-31 VITALS — RESPIRATION RATE: 16 BRPM | HEART RATE: 70 BPM | SYSTOLIC BLOOD PRESSURE: 110 MMHG | DIASTOLIC BLOOD PRESSURE: 52 MMHG

## 2023-10-31 DIAGNOSIS — R31.0 GROSS HEMATURIA: ICD-10-CM

## 2023-10-31 DIAGNOSIS — F32.A DEPRESSION, UNSPECIFIED DEPRESSION TYPE: ICD-10-CM

## 2023-10-31 DIAGNOSIS — R33.9 INCOMPLETE EMPTYING OF BLADDER: Primary | ICD-10-CM

## 2023-10-31 DIAGNOSIS — N20.0 KIDNEY STONE: ICD-10-CM

## 2023-10-31 PROCEDURE — 51798 US URINE CAPACITY MEASURE: CPT | Performed by: STUDENT IN AN ORGANIZED HEALTH CARE EDUCATION/TRAINING PROGRAM

## 2023-10-31 PROCEDURE — 52000 CYSTOURETHROSCOPY: CPT | Performed by: STUDENT IN AN ORGANIZED HEALTH CARE EDUCATION/TRAINING PROGRAM

## 2023-10-31 RX ORDER — CIPROFLOXACIN 250 MG/1
250 TABLET, FILM COATED ORAL ONCE
Status: COMPLETED | OUTPATIENT
Start: 2023-10-31 | End: 2023-10-31

## 2023-10-31 RX ORDER — FLUOXETINE HCL 20 MG
20 CAPSULE ORAL DAILY
Qty: 90 CAPSULE | Refills: 1 | Status: SHIPPED | OUTPATIENT
Start: 2023-10-31 | End: 2024-05-28

## 2023-10-31 RX ADMIN — CIPROFLOXACIN 250 MG: 250 TABLET, FILM COATED ORAL at 11:30

## 2023-10-31 NOTE — PROGRESS NOTES
Reason for cystoscopy: gross hematuria    Brief History:  Rubén Lane is a 89 year old male with a history of osteoarthritis, HLD, CAD, CHF, atrial fibrillation, and HTN who presents for evaluation of gross hematuria.      He reports a few episodes of gross hematuria in the last month. He currently denies any dysuria, pyuria, hesitancy, intermittency, feelings of incomplete emptying, or any recent history of urinary tract infections or stones.     He had a CT abdomen pelvis with contrast on 8/27/2023 which showed a 5 mm non-obstructing left mid renal stone and diffuse bladder wall thickening.      He quit smoking about 60 years ago. He was a heavy smoker.     PVR was 122    Imaging  CT report  KIDNEYS/BLADDER: Benign right renal cyst does not require follow-up. Nonobstructing left mid renal calculus measuring 5 mm. No hydronephrosis. Diffuse trabeculated circumferential wall thickening of the bladder with multiple lateral diverticula.         The following distinct labs were reviewed   Most Recent Urinalysis:  Recent Labs   Lab Test 09/29/23  1136   COLOR Yellow   APPEARANCE Clear   URINEGLC Negative   URINEBILI Negative   URINEKETONE Negative   SG 1.015   UBLD Negative   URINEPH 7.0   PROTEIN Negative   UROBILINOGEN 1.0   NITRITE Negative   LEUKEST Trace*   RBCU 0-2   WBCU 0-5       CYSTOSCOPY  We discussed the risks and benefits of the procedure which include risk of bleeding and infection.  Informed consent was obtained, the patient was prepped and draped in the standard sterile fashion.  The 15 Latvian flexible cystoscope was inserted through the urethral meatus.      The anterior urethra was:  normal without stricture.    The external sphincter was  appropriately coapted.   The prostatic urethra demonstrated trilobar hypertrophy with coapting lateral lobes    The bladder neck was nonocclusive.    The bladder was  unremarkable for tumors, erythema or stones.    The ureteral orifices  were identified on each side  in orthotopic position   There were severe trabeculations.    On retroflexion there was the usual bladder neck hyperemia.    There was intravesical protrusion of the prostate with some evidence of large blood vessels that seemed friable   He also had a diverticulum posteriorly that I was able to examine and was normal                              Assessment and Plan    Non obstructing stone - 5 mm - will observe  Incomplete emptying - likely due to BPH. He has slightly elevated PVR and bladder with severe trabeculations however he is not bothered by urinary symptoms - will monitor with renal US in 6months to check for hydronephrosis and PVR  Gross hematuria - likely from the median lobe large vessels. No tumors. CT with contrast was normal per report no renal tumors. I think doing bilateral retrogrades to evaluate ureter low yield in this case with no hydronephrosis and negative cytology too  We discussed adding finasteride for the enlarged prostate and the hematuria but he was not too bothered by sx and did not want another medication    Plan to see him back in 6 months with renal US

## 2023-10-31 NOTE — NURSING NOTE
"Initial /52 (BP Location: Right arm, Patient Position: Chair, Cuff Size: Adult Regular)   Pulse 70   Resp 16  Estimated body mass index is 28.57 kg/m  as calculated from the following:    Height as of 9/29/23: 1.676 m (5' 6\").    Weight as of 9/29/23: 80.3 kg (177 lb). .  Patient is here a cysto.  macie huntley LPN    "

## 2024-03-29 DIAGNOSIS — F32.A DEPRESSION, UNSPECIFIED DEPRESSION TYPE: ICD-10-CM

## 2024-03-29 NOTE — TELEPHONE ENCOUNTER
Overdue for needed care. Please call to schedule 6 month med check.    Yumiko Richard RN  [facility]

## 2024-04-09 ENCOUNTER — TELEPHONE (OUTPATIENT)
Dept: DERMATOLOGY | Facility: CLINIC | Age: 89
End: 2024-04-09
Payer: COMMERCIAL

## 2024-04-09 NOTE — TELEPHONE ENCOUNTER
Called and scheduled patient appointment with Dr. Keller.  Thank you,    Marni LOPEZRN BSN  [facility]- [phone]

## 2024-04-09 NOTE — TELEPHONE ENCOUNTER
[facility] Call Center    Phone Message    May a detailed message be left on voicemail: yes     Reason for Call: Patient has a concerning spot on his face that is itching and does not feel he can wait until next avail in July - Patient does not have MyChart- Please call back [phone] Thank you    Action Taken: Other: [place] [facility]    Travel Screening: Not Applicable

## 2024-04-15 ENCOUNTER — OFFICE VISIT (OUTPATIENT)
Dept: DERMATOLOGY | Facility: CLINIC | Age: 89
End: 2024-04-15
Payer: COMMERCIAL

## 2024-04-15 DIAGNOSIS — L57.0 AK (ACTINIC KERATOSIS): ICD-10-CM

## 2024-04-15 DIAGNOSIS — D23.9 DERMAL NEVUS: Primary | ICD-10-CM

## 2024-04-15 DIAGNOSIS — L81.4 LENTIGO: ICD-10-CM

## 2024-04-15 DIAGNOSIS — D18.01 ANGIOMA OF SKIN: ICD-10-CM

## 2024-04-15 DIAGNOSIS — L82.1 SEBORRHEIC KERATOSES: ICD-10-CM

## 2024-04-15 DIAGNOSIS — Z85.828 HISTORY OF SKIN CANCER: ICD-10-CM

## 2024-04-15 PROCEDURE — 17000 DESTRUCT PREMALG LESION: CPT | Performed by: DERMATOLOGY

## 2024-04-15 PROCEDURE — 17003 DESTRUCT PREMALG LES 2-14: CPT | Performed by: DERMATOLOGY

## 2024-04-15 PROCEDURE — 99213 OFFICE O/P EST LOW 20 MIN: CPT | Mod: 25 | Performed by: DERMATOLOGY

## 2024-04-15 NOTE — LETTER
4/15/2024         RE: Rubén Lane  [address]  [facility] [address]        Dear Colleague,    Thank you for referring your patient, Rubén Lane, to the [facility]. Please see a copy of my visit note below.    Rubén Lane is an extremely pleasant 89 year old year old male patient here today for rough spot on left jawline.  Patient has no other skin complaints today.  Remainder of the HPI, Meds, PMH, Allergies, FH, and SH was reviewed in chart.      Past Medical History:   Diagnosis Date     Squamous cell carcinoma of skin, unspecified        No past surgical history on file.     No family history on file.    Social History     Socioeconomic History     Marital status:      Spouse name: Not on file     Number of children: Not on file     Years of education: Not on file     Highest education level: Not on file   Occupational History     Not on file   Tobacco Use     Smoking status: Former     Smokeless tobacco: Never   Substance and Sexual Activity     Alcohol use: Not on file     Drug use: Not on file     Sexual activity: Not on file   Other Topics Concern     Not on file   Social History Narrative     Not on file     Social Determinants of Health     Financial Resource Strain: Not on file   Food Insecurity: Not on file   Transportation Needs: Not on file   Physical Activity: Not on file   Stress: Not on file   Social Connections: Not on file   Interpersonal Safety: Not on file   Housing Stability: Not on file       Outpatient Encounter Medications as of 4/15/2024   Medication Sig Dispense Refill     acetaminophen (TYLENOL) 500 MG tablet Take 1,000 mg by mouth 3 times daily       albuterol (PROAIR HFA/PROVENTIL HFA/VENTOLIN HFA) 108 (90 Base) MCG/ACT inhaler Inhale 2 puffs into the lungs 4 times daily (Patient not taking: Reported on 10/31/2023)       aspirin (ASA) 81 MG EC tablet Take 81 mg by mouth daily       Ferrous Gluconate-C-Folic Acid (IRON-C PO) Take 65 mg by mouth        FLUoxetine (PROZAC) 20 MG capsule TAKE 1 CAPSULE DAILY 90 capsule 1     nitroGLYcerin (NITROSTAT) 0.4 MG sublingual tablet Place 0.4 mg under the tongue every 5 minutes as needed for chest pain For chest pain place 1 tablet under the tongue every 5 minutes for 3 doses. If symptoms persist 5 minutes after 1st dose call 911.  (Patient not taking: Reported on 6/13/2022)       oxyCODONE (ROXICODONE) 5 MG tablet Take 1 tablet (5 mg) by mouth every 6 hours as needed for severe pain (Patient not taking: Reported on 9/7/2023) 6 tablet 0     rosuvastatin (CRESTOR) 40 MG tablet Take 40 mg by mouth daily       sotalol (BETAPACE) 80 MG tablet Take 80 mg by mouth daily       No facility-administered encounter medications on file as of 4/15/2024.             O:   NAD, WDWN, Alert & Oriented, Mood & Affect wnl, Vitals stable   General appearance normal   Vitals stable   Alert, oriented and in no acute distress     Gritty scaly papules on face   Stuck on papules and brown macules on trunk and ext   Red papules on trunk  Flesh colored papules on trunk         Eyes: Conjunctivae/lids:Normal     ENT: Lips, buccal mucosa, tongue: normal    MSK:Normal    Cardiovascular: peripheral edema none    Pulm: Breathing Normal    Neuro/Psych: Orientation:Alert and Orientedx3 ; Mood/Affect:normal       A/P:  1. Seborrheic keratosis, lentigo, angioma, dermal nevus, hx of non-melanoma skin cancer   2. Actinic keratosis   L jawline x3, R PA cheek x1, L temple x1  LN2:  Treated with LN2 for 5s for 1-2 cycles. Warned risks of blistering, pain, pigment change, scarring, and incomplete resolution.  Advised patient to return if lesions do not completely resolve.  Wound care sheet given.  It was a pleasure speaking to Rubén Lane today.  Previous clinic notes and pertinent laboratory tests were reviewed prior to Rubén Lane's visit.  Nature and genetics of benign skin lesions dicussed with patient.  Signs and Symptoms of skin cancer discussed with  patient.  Patient encouraged to perform monthly skin exams.  UV precautions reviewed with patient.  Return to clinic 6 months      Again, thank you for allowing me to participate in the care of your patient.        Sincerely,        Ernie Keller MD

## 2024-04-15 NOTE — PATIENT INSTRUCTIONS
Hand out given to patient.     WOUND CARE INSTRUCTIONS   FOR CRYOSURGERY   This area treated with liquid nitrogen should form a blister (areas treated may or may not blister-skin may just turn dark and slough off). You do not need to bandage the area unless a blister forms and breaks (which may be a few days). When the blister breaks, begin daily dressing changes as follows:  1) Clean and dry the area with tap water using clean Q-tip or sterile gauze pad.   2) Apply Polysporin ointment or Bacitracin ointment over entire wound. Do NOT use Neosporin ointment.   3) Cover the wound with a band-aid or sterile non-stick gauze pad and micropore paper tape.   REPEAT THESE INSTRUCTIONS AT LEAST ONCE A DAY UNTIL THE WOUND HAS COMPLETELY HEALED.   It is an old wives tale that a wound heals better when it is exposed to air and allowed to dry out. The wound will heal faster with a better cosmetic result if it is kept moist with ointment and covered with a bandage.   Do not let the wound dry out.   IMPORTANT INFORMATION ON REVERSE SIDE   Supplies Needed:   *Cotton tipped applicators (Q-tips)   *Polysporin ointment or Bacitracin ointment (NOT NEOSPORIN)   *Band-aids, or non stick gauze pads and micropore paper tape   PATIENT INFORMATION   During the healing process you will notice a number of changes. All wounds develop a small halo of redness surrounding the wound. This means healing is occurring. Severe itching with extensive redness usually indicates sensitivity to the ointment or bandage tape used to dress the wound. You should call our office if this develops.   Swelling and/or discoloration around your surgical site is common, particularly when performed around the eye.   All wounds normally drain. The larger the wound the more drainage there will be. After 7-10 days, you will notice the wound beginning to shrink and new skin will begin to grow. The wound is healed when you can see skin has formed over the entire area. A  healed wound has a healthy, shiny look to the surface and is red to dark pink in color to normalize. Wounds may take approximately 4-6 weeks to heal. Larger wounds may take 6-8 weeks. After the wound is healed you may discontinue dressing changes.   You may experience a sensation of tightness as your wound heals. This is normal and will gradually subside.   Your healed wound may be sensitive to temperature changes. This sensitivity improves with time, but if you re having a lot of discomfort, try to avoid temperature extremes.   Patients frequently experience itching after their wound appears to have healed because of the continue healing under the skin. Plain Vaseline will help relieve the itching.

## 2024-04-15 NOTE — PROGRESS NOTES
Rubén Lane is an extremely pleasant 89 year old year old male patient here today for rough spot on left jawline.  Patient has no other skin complaints today.  Remainder of the HPI, Meds, PMH, Allergies, FH, and SH was reviewed in chart.      Past Medical History:   Diagnosis Date    Squamous cell carcinoma of skin, unspecified        No past surgical history on file.     No family history on file.    Social History     Socioeconomic History    Marital status:      Spouse name: Not on file    Number of children: Not on file    Years of education: Not on file    Highest education level: Not on file   Occupational History    Not on file   Tobacco Use    Smoking status: Former    Smokeless tobacco: Never   Substance and Sexual Activity    Alcohol use: Not on file    Drug use: Not on file    Sexual activity: Not on file   Other Topics Concern    Not on file   Social History Narrative    Not on file     Social Determinants of Health     Financial Resource Strain: Not on file   Food Insecurity: Not on file   Transportation Needs: Not on file   Physical Activity: Not on file   Stress: Not on file   Social Connections: Not on file   Interpersonal Safety: Not on file   Housing Stability: Not on file       Outpatient Encounter Medications as of 4/15/2024   Medication Sig Dispense Refill    acetaminophen (TYLENOL) 500 MG tablet Take 1,000 mg by mouth 3 times daily      albuterol (PROAIR HFA/PROVENTIL HFA/VENTOLIN HFA) 108 (90 Base) MCG/ACT inhaler Inhale 2 puffs into the lungs 4 times daily (Patient not taking: Reported on 10/31/2023)      aspirin (ASA) 81 MG EC tablet Take 81 mg by mouth daily      Ferrous Gluconate-C-Folic Acid (IRON-C PO) Take 65 mg by mouth      FLUoxetine (PROZAC) 20 MG capsule TAKE 1 CAPSULE DAILY 90 capsule 1    nitroGLYcerin (NITROSTAT) 0.4 MG sublingual tablet Place 0.4 mg under the tongue every 5 minutes as needed for chest pain For chest pain place 1 tablet under the tongue every 5 minutes  for 3 doses. If symptoms persist 5 minutes after 1st dose call 911.  (Patient not taking: Reported on 6/13/2022)      oxyCODONE (ROXICODONE) 5 MG tablet Take 1 tablet (5 mg) by mouth every 6 hours as needed for severe pain (Patient not taking: Reported on 9/7/2023) 6 tablet 0    rosuvastatin (CRESTOR) 40 MG tablet Take 40 mg by mouth daily      sotalol (BETAPACE) 80 MG tablet Take 80 mg by mouth daily       No facility-administered encounter medications on file as of 4/15/2024.             O:   NAD, WDWN, Alert & Oriented, Mood & Affect wnl, Vitals stable   General appearance normal   Vitals stable   Alert, oriented and in no acute distress     Gritty scaly papules on face   Stuck on papules and brown macules on trunk and ext   Red papules on trunk  Flesh colored papules on trunk         Eyes: Conjunctivae/lids:Normal     ENT: Lips, buccal mucosa, tongue: normal    MSK:Normal    Cardiovascular: peripheral edema none    Pulm: Breathing Normal    Neuro/Psych: Orientation:Alert and Orientedx3 ; Mood/Affect:normal       A/P:  1. Seborrheic keratosis, lentigo, angioma, dermal nevus, hx of non-melanoma skin cancer   2. Actinic keratosis   L jawline x3, R PA cheek x1, L temple x1  LN2:  Treated with LN2 for 5s for 1-2 cycles. Warned risks of blistering, pain, pigment change, scarring, and incomplete resolution.  Advised patient to return if lesions do not completely resolve.  Wound care sheet given.  It was a pleasure speaking to Rubén Lane today.  Previous clinic notes and pertinent laboratory tests were reviewed prior to Rubén Lane's visit.  Nature and genetics of benign skin lesions dicussed with patient.  Signs and Symptoms of skin cancer discussed with patient.  Patient encouraged to perform monthly skin exams.  UV precautions reviewed with patient.  Return to clinic 6 months

## 2024-04-24 ENCOUNTER — HOSPITAL ENCOUNTER (OUTPATIENT)
Dept: ULTRASOUND IMAGING | Facility: CLINIC | Age: 89
Discharge: HOME | End: 2024-04-24
Attending: STUDENT IN AN ORGANIZED HEALTH CARE EDUCATION/TRAINING PROGRAM | Admitting: STUDENT IN AN ORGANIZED HEALTH CARE EDUCATION/TRAINING PROGRAM
Payer: COMMERCIAL

## 2024-04-24 DIAGNOSIS — N20.0 KIDNEY STONE: ICD-10-CM

## 2024-04-24 DIAGNOSIS — R33.9 INCOMPLETE EMPTYING OF BLADDER: ICD-10-CM

## 2024-04-24 PROCEDURE — 76770 US EXAM ABDO BACK WALL COMP: CPT

## 2024-04-30 ENCOUNTER — VIRTUAL VISIT (OUTPATIENT)
Dept: UROLOGY | Facility: CLINIC | Age: 89
End: 2024-04-30
Payer: COMMERCIAL

## 2024-04-30 DIAGNOSIS — N20.0 KIDNEY STONE: Primary | ICD-10-CM

## 2024-04-30 PROCEDURE — 99441 PR PHYSICIAN TELEPHONE EVALUATION 5-10 MIN: CPT | Mod: 93 | Performed by: STUDENT IN AN ORGANIZED HEALTH CARE EDUCATION/TRAINING PROGRAM

## 2024-04-30 RX ORDER — LISINOPRIL 40 MG/1
1 TABLET ORAL DAILY
COMMUNITY
Start: 2023-11-21

## 2024-04-30 NOTE — PROGRESS NOTES
Rubén is a 89 year old who is being evaluated via a billable telephone visit.    What phone number would you like to be contacted at? [phone]  How would you like to obtain your AVS? Mail a copy  Originating Location (pt. Location): Home    Distant Location (provider location):  On-site    Trinidad Drew MA

## 2024-04-30 NOTE — PROGRESS NOTES
UROLOGY OUTPATIENT VISIT      Chief Complaint:   Follow-up stone      Synopsis   Rubén Lane is a very pleasant AGE: 89 year old year old person   history of osteoarthritis, HLD, CAD, CHF, atrial fibrillation, and HTN     10/31/2023 I did a cystoscopy for gross hematuria. 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     Gross hematuria - likely from the median lobe large vessels. No tumors. CT with contrast was normal per report no renal tumors. I think doing bilateral retrogrades to evaluate ureter low yield in this case with no hydronephrosis and negative cytology too     We discussed adding finasteride for the enlarged prostate and the hematuria but he was not too botherd    4/24/2024 had renal US - no hydronephrosis, post void residual was 16 ml. 8 mm stone in the left mid kidney.    History of Present Illness  The patient presents via virtual visit for evaluation of kidney stone. He is accompanied by his wife.    The patient reports no instances of passing kidney stones since his last visit. He negates experiencing any severe left-sided pain, hematuria, or urinary issues.    Imaging  He had a CT abdomen pelvis with contrast on 8/27/2023 which showed a 5 mm non-obstructing left mid renal stone and diffuse bladder wall thickening.     The following distinct labs were reviewed   Most Recent 3 BMP's:  Recent Labs   Lab Test 10/12/22  1431 10/16/21  1044 06/28/21  1250    147* 139   POTASSIUM 4.6 4.6 4.8   CHLORIDE 102 114* 107   CO2 30* 29 27   BUN 10.1 11 7   CR 0.74 0.62* 0.66   ANIONGAP 6* 4 5   LIZ 8.6* 8.3* 8.7   * 98 96       Medical Comorbidities      Past Medical History:   Diagnosis Date    Squamous cell carcinoma of skin, unspecified                Medications     Current Outpatient Medications   Medication Sig Dispense Refill     acetaminophen (TYLENOL) 500 MG tablet Take 1,000 mg by mouth 3 times daily      albuterol (PROAIR HFA/PROVENTIL HFA/VENTOLIN HFA) 108 (90 Base) MCG/ACT inhaler Inhale 2 puffs into the lungs 4 times daily (Patient not taking: Reported on 10/31/2023)      aspirin (ASA) 81 MG EC tablet Take 81 mg by mouth daily      Ferrous Gluconate-C-Folic Acid (IRON-C PO) Take 65 mg by mouth      FLUoxetine (PROZAC) 20 MG capsule TAKE 1 CAPSULE DAILY 90 capsule 1    nitroGLYcerin (NITROSTAT) 0.4 MG sublingual tablet Place 0.4 mg under the tongue every 5 minutes as needed for chest pain For chest pain place 1 tablet under the tongue every 5 minutes for 3 doses. If symptoms persist 5 minutes after 1st dose call 911.  (Patient not taking: Reported on 6/13/2022)      oxyCODONE (ROXICODONE) 5 MG tablet Take 1 tablet (5 mg) by mouth every 6 hours as needed for severe pain (Patient not taking: Reported on 9/7/2023) 6 tablet 0    rosuvastatin (CRESTOR) 40 MG tablet Take 40 mg by mouth daily      sotalol (BETAPACE) 80 MG tablet Take 80 mg by mouth daily       No current facility-administered medications for this visit.            Assessment/Plan   Rubén Lane is a very pleasant AGE: 89 year old year old person   history of osteoarthritis, HLD, CAD, CHF, atrial fibrillation, and HTN       Assessment & Plan  1. Nephrolithiasis.  A follow-up ultrasound is scheduled for one year from now with virtual visit to monitor the left renal stone. Overall likely similar in size.           CC:  Reed Momin    Additional Coding Information:    Telephone call duration 9 minutes

## 2024-04-30 NOTE — NURSING NOTE
Chief Complaint   Patient presents with    RECHECK       There were no vitals filed for this visit.  Wt Readings from Last 1 Encounters:   09/29/23 80.3 kg (177 lb)       Trinidad Drew MA

## 2024-05-28 DIAGNOSIS — F32.A DEPRESSION, UNSPECIFIED DEPRESSION TYPE: ICD-10-CM

## 2024-05-28 NOTE — TELEPHONE ENCOUNTER
Pending Prescriptions:                       Disp   Refills    FLUoxetine (PROZAC) 20 MG capsule         90 cap*1            Sig: Take 1 capsule (20 mg) by mouth daily      Last office visit: 9/7/2023 ; last virtual visit: Visit date not found with prescribing

## 2024-08-28 ENCOUNTER — TRANSFERRED RECORDS (OUTPATIENT)
Dept: MULTI SPECIALTY CLINIC | Facility: CLINIC | Age: 89
End: 2024-08-28

## 2024-08-28 LAB
ALT SERPL-CCNC: 10 U/L
AST SERPL-CCNC: 18 U/L (ref 10–40)
CHOLESTEROL (EXTERNAL): 135 MG/DL (ref 0–199)
CREATININE (EXTERNAL): 0.67 MG/DL (ref 0.73–1.18)
GFR ESTIMATED (EXTERNAL): >60 ML/MIN/1.73M2
GLUCOSE (EXTERNAL): 105 MG/DL (ref 70–100)
HDLC SERPL-MCNC: 43 MG/DL
LDL CHOLESTEROL CALCULATED (EXTERNAL): 77 MG/DL
NON HDL CHOLESTEROL (EXTERNAL): 92 MG/DL
POTASSIUM (EXTERNAL): 4.4 MMOL/L (ref 3.5–5.1)
TRIGLYCERIDES (EXTERNAL): 73 MG/DL
TSH SERPL-ACNC: 1.36 UIU/ML (ref 0.3–4.5)

## 2024-09-24 ENCOUNTER — IMMUNIZATION (OUTPATIENT)
Dept: FAMILY MEDICINE | Facility: CLINIC | Age: 89
End: 2024-09-24
Payer: COMMERCIAL

## 2024-09-24 PROCEDURE — 90662 IIV NO PRSV INCREASED AG IM: CPT

## 2024-09-24 PROCEDURE — G0008 ADMIN INFLUENZA VIRUS VAC: HCPCS

## 2024-10-10 DIAGNOSIS — F32.A DEPRESSION, UNSPECIFIED DEPRESSION TYPE: ICD-10-CM

## 2024-10-11 RX ORDER — FLUOXETINE HCL 20 MG
20 CAPSULE ORAL DAILY
Qty: 90 CAPSULE | Refills: 0 | Status: SHIPPED | OUTPATIENT
Start: 2024-10-11

## 2024-12-13 DIAGNOSIS — F32.A DEPRESSION, UNSPECIFIED DEPRESSION TYPE: ICD-10-CM

## 2024-12-16 RX ORDER — FLUOXETINE HCL 20 MG
20 CAPSULE ORAL DAILY
Qty: 30 CAPSULE | Refills: 1 | Status: SHIPPED | OUTPATIENT
Start: 2024-12-16

## 2025-01-20 ENCOUNTER — TELEPHONE (OUTPATIENT)
Dept: UROLOGY | Facility: CLINIC | Age: OVER 89
End: 2025-01-20
Payer: COMMERCIAL

## 2025-01-20 NOTE — TELEPHONE ENCOUNTER
----- Message from Marietta DAMON sent at 11/1/2024  8:40 AM CDT -----  Regarding: FW: post virtual visit follow-up plan  Please call patient to remind them of their appointment with Dr. Osorio.     Thank you  ----- Message -----  From: Marietta Sparks LPN  Sent: 11/1/2024  12:00 AM CDT  To: Sandra Rsos  All Staff Pool  Subject: FW: post virtual visit follow-up plan              ----- Message -----  From: Shantal Osorio MD  Sent: 4/30/2024   9:53 AM CDT  To: Joycelyn Marks LPN; Marietta Sparks LPN  Subject: post virtual visit follow-up plan                Hi    I did a virtual visit with this patient today    Follow-up 1 year with renal US prior, phone visit with Lauren Louis!  [place]

## 2025-01-22 DIAGNOSIS — Z01.810 PRE-OPERATIVE CARDIOVASCULAR EXAMINATION: Primary | ICD-10-CM

## 2025-01-23 DIAGNOSIS — Z01.810 PRE-OPERATIVE CARDIOVASCULAR EXAMINATION: Primary | ICD-10-CM

## 2025-04-15 DIAGNOSIS — F32.A DEPRESSION, UNSPECIFIED DEPRESSION TYPE: ICD-10-CM

## 2025-04-16 RX ORDER — FLUOXETINE HCL 20 MG
20 CAPSULE ORAL DAILY
Qty: 90 CAPSULE | Refills: 0 | Status: SHIPPED | OUTPATIENT
Start: 2025-04-16

## 2025-04-16 ASSESSMENT — ANXIETY QUESTIONNAIRES
2. NOT BEING ABLE TO STOP OR CONTROL WORRYING: NOT AT ALL
7. FEELING AFRAID AS IF SOMETHING AWFUL MIGHT HAPPEN: NOT AT ALL
GAD7 TOTAL SCORE: 0
1. FEELING NERVOUS, ANXIOUS, OR ON EDGE: NOT AT ALL
IF YOU CHECKED OFF ANY PROBLEMS ON THIS QUESTIONNAIRE, HOW DIFFICULT HAVE THESE PROBLEMS MADE IT FOR YOU TO DO YOUR WORK, TAKE CARE OF THINGS AT HOME, OR GET ALONG WITH OTHER PEOPLE: NOT DIFFICULT AT ALL
3. WORRYING TOO MUCH ABOUT DIFFERENT THINGS: NOT AT ALL
GAD7 TOTAL SCORE: 0
6. BECOMING EASILY ANNOYED OR IRRITABLE: NOT AT ALL
5. BEING SO RESTLESS THAT IT IS HARD TO SIT STILL: NOT AT ALL

## 2025-04-16 ASSESSMENT — PATIENT HEALTH QUESTIONNAIRE - PHQ9
SUM OF ALL RESPONSES TO PHQ QUESTIONS 1-9: 2
5. POOR APPETITE OR OVEREATING: NOT AT ALL

## 2025-04-16 NOTE — TELEPHONE ENCOUNTER
Spoke with spouse and patient, completed PHQW-9/MILANA-7, appt for annual wellness exam scheduled 5/1/25 with Dr. Reed Momin.         10/12/2022     1:03 PM 9/7/2023    10:22 AM 4/16/2025     1:09 PM   PHQ   PHQ-9 Total Score 8 3 2   Q9: Thoughts of better off dead/self-harm past 2 weeks Not at all  Not at all Not at all       Proxy-reported          5/12/2021     2:44 PM 9/7/2023    10:23 AM 4/16/2025     1:09 PM   MILANA-7 SCORE   Total Score  0 (minimal anxiety)    Total Score 3 0 0     Mariana refill given x `.   Prescription approved per [facility] Refill Protocol.  Julie Behrendt RN Julie Behrendt RN

## 2025-04-16 NOTE — TELEPHONE ENCOUNTER
Called patient he is very [place], unable to hear me on the phone and he requested we call back later this afternoon when his wife is home.     Needs annual wellness exam scheduled with Dr. Momin and PHQ-9 completed.     Julie Behrendt RN

## 2025-04-29 NOTE — CONFIDENTIAL NOTE
UROLOGY FOLLOW-UP NOTE          Chief Complaint:   Today I had the pleasure of seeing Mr. Rubén Lane in follow-up for a chief complaint of kidney stones.          Interval Update   Rubén Lane is a very pleasant 89 year old male with a history of history of osteoarthritis, HLD, CAD, CHF, atrial fibrillation, and HTN.     Brief History: Mr. Rubén Lane has followed with urology for kidney stones and gross hematuria. Hematuria was thought to be due to prostate bleeding. CT abdomen pelvis on 8/27/2023 showed a 5 mm left mid renal stone.     Today's notes: ***         Physical Exam:   Patient is a 89 year old female evaluated via telephone visit.         Labs and Pathology:    I personally reviewed all applicable laboratory data and went over findings with patient  Significant for:    CBC RESULTS:  Recent Labs   Lab Test 01/24/25 0927   WBC 7.4   HGB 13.1*   *        BMP RESULTS:  Recent Labs   Lab Test 01/24/25  0927 10/12/22  1431 10/16/21  1044 06/28/21  1250    138 147* 139   POTASSIUM 4.5 4.6 4.6 4.8   CHLORIDE 105 102 114* 107   CO2 26 30* 29 27   ANIONGAP 11 6* 4 5   * 102* 98 96   BUN 7.5* 10.1 11 7   CR 0.74 0.74 0.62* 0.66   GFRESTIMATED 85 87 89 86   GFRESTBLACK  --   --   --  >90   LIZ 8.8 8.6* 8.3* 8.7       UA RESULTS:   Recent Labs   Lab Test 09/29/23  1136 09/18/23  1052 06/26/23  1029   SG 1.015 1.020 1.010   URINEPH 7.0 7.0 6.5   NITRITE Negative Negative Negative   RBCU 0-2 0-2 0-2   WBCU 0-5 0-5 0-5          Assessment/Plan   89 year old male   ***                   Past Medical History:     Past Medical History:   Diagnosis Date    Squamous cell carcinoma of skin, unspecified             Past Surgical History:   No past surgical history on file.         Medications     Current Outpatient Medications   Medication Sig Dispense Refill    acetaminophen (TYLENOL) 500 MG tablet Take 1,000 mg by mouth 3 times daily (Patient not taking: Reported on 1/24/2025)      aspirin (ASA) 81  MG EC tablet Take 81 mg by mouth daily      Ferrous Gluconate-C-Folic Acid (IRON-C PO) Take 65 mg by mouth      FLUoxetine (PROZAC) 20 MG capsule TAKE 1 CAPSULE DAILY 90 capsule 0    metoprolol tartrate (LOPRESSOR) 25 MG tablet Take 25 mg by mouth 2 times daily.      nitroGLYcerin (NITROSTAT) 0.4 MG sublingual tablet Place 0.4 mg under the tongue every 5 minutes as needed for chest pain. For chest pain place 1 tablet under the tongue every 5 minutes for 3 doses. If symptoms persist 5 minutes after 1st dose call 911.      rivaroxaban ANTICOAGULANT (XARELTO ANTICOAGULANT) 20 MG TABS tablet TAKE 1 TABLET(20 MG) BY MOUTH DAILY FOR ATRIAL FIBRILLATION      rosuvastatin (CRESTOR) 40 MG tablet Take 40 mg by mouth daily      sotalol (BETAPACE) 80 MG tablet Take 80 mg by mouth daily      ZESTRIL 40 MG tablet Take 1 tablet by mouth daily       No current facility-administered medications for this visit.            Family History:   No family history on file.         Social History:     Social History     Socioeconomic History    Marital status:      Spouse name: Not on file    Number of children: Not on file    Years of education: Not on file    Highest education level: Not on file   Occupational History    Not on file   Tobacco Use    Smoking status: Former    Smokeless tobacco: Never   Vaping Use    Vaping status: Never Used   Substance and Sexual Activity    Alcohol use: Not on file    Drug use: Not on file    Sexual activity: Not on file   Other Topics Concern    Not on file   Social History Narrative    Not on file     Social Drivers of Health     Financial Resource Strain: Not on file   Food Insecurity: No Food Insecurity (8/27/2023)    Received from [facility]    Hunger Vital Sign     Worried About Running Out of Food in the Last Year: Never true     Ran Out of Food in the Last Year: Never true   Transportation Needs: Not on file   Physical Activity: Not on file   Stress: Not on file   Social Connections:  Not on file   Interpersonal Safety: Not on file   Housing Stability: Not on file            Allergies:   Patient has no known allergies.         Review of Systems:  From intake questionnaire   Negative 14 system review except as noted on HPI, nurse's note.        Lauren Aranda PA-C  Department of Urology

## 2025-04-30 ENCOUNTER — VIRTUAL VISIT (OUTPATIENT)
Dept: UROLOGY | Facility: CLINIC | Age: OVER 89
End: 2025-04-30
Payer: COMMERCIAL

## 2025-04-30 DIAGNOSIS — N20.0 KIDNEY STONE: Primary | ICD-10-CM

## 2025-04-30 NOTE — PROGRESS NOTES
"Rubén Lane is a 89 year old year old who is being evaluated via a billable video visit.      How would you like to obtain your AVS? MyChart  If the video visit is dropped, the invitation should be resent by: Telephone visit , wife (Yaritza) will be present   Will anyone else be joining your video visit? No    Video-Visit Details    Type of service:  Video Visit   Video Start Time: {video visit start/end time for provider to select:396630}  Video End Time:{video visit start/end time for provider to select:733552}    Originating Location (pt. Location): {video visit patient location:874722::\"Home\"}  {PROVIDER LOCATION On-site should be selected for visits conducted from your clinic location or adjoining [facility] hospital, academic office, or other nearby [facility] building. Off-site should be selected for all other provider locations, including home:707926}  Distant Location (provider location):  {virtual location provider:848172}  Platform used for Video Visit: {Virtual Visit Platforms:323151::\"[place]\"}    "

## 2025-04-30 NOTE — PROGRESS NOTES
Patient has not had renal US. New US ordered as previous order will  today. Appointment rescheduled. No charge.

## 2025-05-01 ENCOUNTER — OFFICE VISIT (OUTPATIENT)
Dept: FAMILY MEDICINE | Facility: CLINIC | Age: OVER 89
End: 2025-05-01
Payer: COMMERCIAL

## 2025-05-01 VITALS
TEMPERATURE: 97.7 F | OXYGEN SATURATION: 95 % | HEIGHT: 66 IN | WEIGHT: 173.2 LBS | SYSTOLIC BLOOD PRESSURE: 118 MMHG | BODY MASS INDEX: 27.83 KG/M2 | RESPIRATION RATE: 22 BRPM | DIASTOLIC BLOOD PRESSURE: 60 MMHG | HEART RATE: 83 BPM

## 2025-05-01 DIAGNOSIS — F32.A DEPRESSION, UNSPECIFIED DEPRESSION TYPE: ICD-10-CM

## 2025-05-01 ASSESSMENT — PAIN SCALES - GENERAL: PAINLEVEL_OUTOF10: NO PAIN (0)

## 2025-05-01 NOTE — PROGRESS NOTES
"S: Rubén Lane is a 89 year old male with anxiety.  Better on fluoxetine.  Would like refills    Other issues managed by cardiology and urology     O:/60 (BP Location: Right arm, Patient Position: Sitting, Cuff Size: Adult Regular)   Pulse 83   Temp 97.7  F (36.5  C) (Tympanic)   Resp 22   Ht 1.676 m (5' 6\")   Wt 78.6 kg (173 lb 3.2 oz)   SpO2 95%   BMI 27.96 kg/m    GEN: Alert and oriented, in no acute distress    A; anxiety,  stable    P: fills for another year.  In person or virtual ok next year for follow up    "

## 2025-05-08 ENCOUNTER — HOSPITAL ENCOUNTER (OUTPATIENT)
Dept: ULTRASOUND IMAGING | Facility: CLINIC | Age: OVER 89
Discharge: HOME | End: 2025-05-08
Attending: STUDENT IN AN ORGANIZED HEALTH CARE EDUCATION/TRAINING PROGRAM
Payer: COMMERCIAL

## 2025-05-08 DIAGNOSIS — N20.0 KIDNEY STONE: ICD-10-CM

## 2025-05-08 PROCEDURE — 76857 US EXAM PELVIC LIMITED: CPT

## 2025-05-09 ENCOUNTER — RESULTS FOLLOW-UP (OUTPATIENT)
Dept: UROLOGY | Facility: CLINIC | Age: OVER 89
End: 2025-05-09

## 2025-05-09 NOTE — RESULT ENCOUNTER NOTE
Can you schedule this patient for follow up with me? He is the patient from our virtual day a couple of weeks ago that hadn't gotten his ultrasound done.

## 2025-05-13 NOTE — CONFIDENTIAL NOTE
UROLOGY FOLLOW-UP NOTE          Chief Complaint:   Today I had the pleasure of seeing Mr. Rubén Lane in follow-up for a chief complaint of kidney stones.          Interval Update   Rubén Lane is a very pleasant 89 year old male with a history of history of osteoarthritis, HLD, CAD, CHF, atrial fibrillation, and HTN.     Brief History: Mr. Rubén Lane has followed with urology for kidney stones and gross hematuria. Hematuria was thought to be due to prostate bleeding. CT abdomen pelvis on 8/27/2023 showed a 5 mm left mid renal stone.     Today's notes: He is doing well. He denies any flank pain, dysuria, and hematuria.          Physical Exam:   Patient is a 89 year old male evaluated via telephone visit.       Labs and Pathology:    I personally reviewed all applicable laboratory data and went over findings with patient  Significant for:    CBC RESULTS:  Recent Labs   Lab Test 01/24/25 0927   WBC 7.4   HGB 13.1*   *        BMP RESULTS:  Recent Labs   Lab Test 01/24/25  0927 10/12/22  1431 10/16/21  1044 06/28/21  1250    138 147* 139   POTASSIUM 4.5 4.6 4.6 4.8   CHLORIDE 105 102 114* 107   CO2 26 30* 29 27   ANIONGAP 11 6* 4 5   * 102* 98 96   BUN 7.5* 10.1 11 7   CR 0.74 0.74 0.62* 0.66   GFRESTIMATED 85 87 89 86   GFRESTBLACK  --   --   --  >90   LIZ 8.8 8.6* 8.3* 8.7       UA RESULTS:   Recent Labs   Lab Test 09/29/23  1136 09/18/23  1052 06/26/23  1029   SG 1.015 1.020 1.010   URINEPH 7.0 7.0 6.5   NITRITE Negative Negative Negative   RBCU 0-2 0-2 0-2   WBCU 0-5 0-5 0-5         Imaging:    I personally reviewed all applicable imaging and went over the below findings with patient.    Results for orders placed or performed during the hospital encounter of 05/08/25   US Renal Complete Non-Vascular    Narrative    EXAM: US RENAL COMPLETE NON-VASCULAR  LOCATION: [facility]  DATE: 5/8/2025    INDICATION: Follow up left intrarenal stone.  COMPARISON: Ultrasound  renal complete 4/24/2024.  TECHNIQUE: Routine Bilateral Renal and Bladder Ultrasound.    FINDINGS:    RIGHT KIDNEY: 10.0 x 5.3 x 5.0 cm. No stones, solid masses or hydronephrosis. Cortical simple cyst superiorly measures 2.3 cm, stable, no specific follow-up required. Normal parenchymal thickness and echogenicity. No significant change.     LEFT KIDNEY: 11.3 x 4.7 x 5.0 cm. Small stone in the mid kidney measures 0.8 x 0.4 x 0.7 cm. No masses or hydronephrosis. Normal parenchymal thickness and echogenicity. No significant change.     BLADDER: Moderately distended, prevoid volume measures 118 mL, post void 48 mL. Bladder wall trabeculation and diverticula formation indicative of long-standing outflow obstruction. Prostatomegaly measuring 6.0 x 7.2 x 6.1 cm, bulging into the bladder   base.      Impression    IMPRESSION:  1.  Small stone in the mid left kidney. No stones on the right. No obstruction on either side.    2.  Cortical simple cyst upper pole of the right kidney, no specific follow-up required.    3.  Prostatomegaly bulging into the bladder base. Bladder wall trabeculation and diverticula formation indicative of long-standing outflow obstruction.    4.  Exam stable when compared to prior study. No significant interval change.              Assessment/Plan   89 year old male seen in follow up for kidney stones and gross hematuria. Hematuria was thought to be due to prostate bleeding. CT abdomen pelvis on 8/27/2023 showed a 5 mm left mid renal stone.     Renal US on 5/8/2025 was notable for a small stone in the mid left kidney, which is stable with previous imaging. He denies any flank pain, dysuria, and gross hematuria.     Plan:  Follow up with urology as needed.            Past Medical History:     Past Medical History:   Diagnosis Date    Squamous cell carcinoma of skin, unspecified             Past Surgical History:   No past surgical history on file.         Medications     Current Outpatient Medications    Medication Sig Dispense Refill    acetaminophen (TYLENOL) 500 MG tablet Take 1,000 mg by mouth 3 times daily (Patient not taking: Reported on 4/30/2024)      aspirin (ASA) 81 MG EC tablet Take 81 mg by mouth daily (Patient not taking: Reported on 5/1/2025)      Ferrous Gluconate-C-Folic Acid (IRON-C PO) Take 65 mg by mouth      FLUoxetine (PROZAC) 20 MG capsule Take 1 capsule (20 mg) by mouth daily. 90 capsule 3    metoprolol tartrate (LOPRESSOR) 25 MG tablet Take 25 mg by mouth 2 times daily.      nitroGLYcerin (NITROSTAT) 0.4 MG sublingual tablet Place 0.4 mg under the tongue every 5 minutes as needed for chest pain. For chest pain place 1 tablet under the tongue every 5 minutes for 3 doses. If symptoms persist 5 minutes after 1st dose call 911. (Patient not taking: Reported on 5/1/2025)      rivaroxaban ANTICOAGULANT (XARELTO ANTICOAGULANT) 20 MG TABS tablet TAKE 1 TABLET(20 MG) BY MOUTH DAILY FOR ATRIAL FIBRILLATION      rosuvastatin (CRESTOR) 40 MG tablet Take 40 mg by mouth daily      sotalol (BETAPACE) 80 MG tablet Take 80 mg by mouth daily      ZESTRIL 40 MG tablet Take 1 tablet by mouth daily       No current facility-administered medications for this visit.            Family History:   No family history on file.         Social History:     Social History     Socioeconomic History    Marital status:      Spouse name: Not on file    Number of children: Not on file    Years of education: Not on file    Highest education level: Not on file   Occupational History    Not on file   Tobacco Use    Smoking status: Former    Smokeless tobacco: Never   Vaping Use    Vaping status: Never Used   Substance and Sexual Activity    Alcohol use: Not on file    Drug use: Not on file    Sexual activity: Not on file   Other Topics Concern    Not on file   Social History Narrative    Not on file     Social Drivers of Health     Financial Resource Strain: Not on file   Food Insecurity: No Food Insecurity (8/27/2023)     Received from [facility]    Hunger Vital Sign     Worried About Running Out of Food in the Last Year: Never true     Ran Out of Food in the Last Year: Never true   Transportation Needs: Not on file   Physical Activity: Not on file   Stress: Not on file   Social Connections: Not on file   Interpersonal Safety: Not on file   Housing Stability: Not on file            Allergies:   Patient has no known allergies.         Review of Systems:  From intake questionnaire   Negative 14 system review except as noted on HPI, nurse's note.        Lauren Aranda PA-C  Department of Urology

## 2025-05-14 ENCOUNTER — VIRTUAL VISIT (OUTPATIENT)
Dept: UROLOGY | Facility: CLINIC | Age: OVER 89
End: 2025-05-14
Payer: COMMERCIAL

## 2025-05-14 DIAGNOSIS — N20.0 KIDNEY STONE: Primary | ICD-10-CM

## 2025-05-14 NOTE — PROGRESS NOTES
Rubén Lane is a 89 year old who is being evaluated via telephone visit.     Telephone visit completed as patient does not have access to camera.     Originating Location (pt. Location): Home    Distant Location (provider location):  Off-site    Phone call duration: 3 minutes

## 2025-08-23 ENCOUNTER — APPOINTMENT (OUTPATIENT)
Dept: CT IMAGING | Facility: CLINIC | Age: OVER 89
End: 2025-08-23
Attending: EMERGENCY MEDICINE
Payer: COMMERCIAL

## 2025-08-23 ENCOUNTER — HOSPITAL ENCOUNTER (EMERGENCY)
Facility: CLINIC | Age: OVER 89
Discharge: ANOTHER HOSPITAL | End: 2025-08-24
Attending: EMERGENCY MEDICINE | Admitting: EMERGENCY MEDICINE
Payer: COMMERCIAL

## 2025-08-23 ENCOUNTER — APPOINTMENT (OUTPATIENT)
Dept: GENERAL RADIOLOGY | Facility: CLINIC | Age: OVER 89
End: 2025-08-23
Attending: EMERGENCY MEDICINE
Payer: COMMERCIAL
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
## 2025-08-23 DIAGNOSIS — I46.9 CARDIAC ARREST (H): ICD-10-CM

## 2025-08-23 DIAGNOSIS — I49.01 VENTRICULAR FIBRILLATION (H): ICD-10-CM

## 2025-08-23 LAB
ANION GAP SERPL CALCULATED.3IONS-SCNC: 11 MMOL/L (ref 7–15)
BASOPHILS # BLD AUTO: 0.04 10E3/UL (ref 0–0.2)
BASOPHILS NFR BLD AUTO: 0.6 %
BUN SERPL-MCNC: 9.2 MG/DL (ref 8–23)
CALCIUM SERPL-MCNC: 8.5 MG/DL (ref 8.8–10.4)
CHLORIDE SERPL-SCNC: 105 MMOL/L (ref 98–107)
CREAT SERPL-MCNC: 0.62 MG/DL (ref 0.67–1.17)
EGFRCR SERPLBLD CKD-EPI 2021: 90 ML/MIN/1.73M2
EOSINOPHIL # BLD AUTO: 0.16 10E3/UL (ref 0–0.7)
EOSINOPHIL NFR BLD AUTO: 2.3 %
ERYTHROCYTE [DISTWIDTH] IN BLOOD BY AUTOMATED COUNT: 14.6 % (ref 10–15)
GLUCOSE SERPL-MCNC: 197 MG/DL (ref 70–99)
HCO3 SERPL-SCNC: 24 MMOL/L (ref 22–29)
HCT VFR BLD AUTO: 35.5 % (ref 40–53)
HGB BLD-MCNC: 11.5 G/DL (ref 13.3–17.7)
HOLD SPECIMEN: NORMAL
IMM GRANULOCYTES # BLD: 0.03 10E3/UL
IMM GRANULOCYTES NFR BLD: 0.4 %
LYMPHOCYTES # BLD AUTO: 1.01 10E3/UL (ref 0.8–5.3)
LYMPHOCYTES NFR BLD AUTO: 14.3 %
MCH RBC QN AUTO: 31.1 PG (ref 26.5–33)
MCHC RBC AUTO-ENTMCNC: 32.4 G/DL (ref 31.5–36.5)
MCV RBC AUTO: 95.9 FL (ref 78–100)
MONOCYTES # BLD AUTO: 0.7 10E3/UL (ref 0–1.3)
MONOCYTES NFR BLD AUTO: 9.9 %
NEUTROPHILS # BLD AUTO: 5.14 10E3/UL (ref 1.6–8.3)
NEUTROPHILS NFR BLD AUTO: 72.5 %
NRBC # BLD AUTO: <0.03 10E3/UL
NRBC BLD AUTO-RTO: 0 /100
PLATELET # BLD AUTO: 147 10E3/UL (ref 150–450)
POTASSIUM SERPL-SCNC: 3.9 MMOL/L (ref 3.4–5.3)
RBC # BLD AUTO: 3.7 10E6/UL (ref 4.4–5.9)
SODIUM SERPL-SCNC: 140 MMOL/L (ref 135–145)
TROPONIN T SERPL HS-MCNC: 20 NG/L
TROPONIN T SERPL HS-MCNC: 33 NG/L
WBC # BLD AUTO: 7.08 10E3/UL (ref 4–11)

## 2025-08-23 PROCEDURE — 90471 IMMUNIZATION ADMIN: CPT | Performed by: EMERGENCY MEDICINE

## 2025-08-23 PROCEDURE — 255N000002 HC RX 255 OP 636: Performed by: EMERGENCY MEDICINE

## 2025-08-23 PROCEDURE — 93296 REM INTERROG EVL PM/IDS: CPT

## 2025-08-23 PROCEDURE — 73562 X-RAY EXAM OF KNEE 3: CPT | Mod: RT

## 2025-08-23 PROCEDURE — 80048 BASIC METABOLIC PNL TOTAL CA: CPT | Performed by: EMERGENCY MEDICINE

## 2025-08-23 PROCEDURE — 250N000011 HC RX IP 250 OP 636: Performed by: EMERGENCY MEDICINE

## 2025-08-23 PROCEDURE — 96374 THER/PROPH/DIAG INJ IV PUSH: CPT | Mod: 59

## 2025-08-23 PROCEDURE — 99285 EMERGENCY DEPT VISIT HI MDM: CPT | Mod: 25 | Performed by: EMERGENCY MEDICINE

## 2025-08-23 PROCEDURE — 70450 CT HEAD/BRAIN W/O DYE: CPT

## 2025-08-23 PROCEDURE — 73552 X-RAY EXAM OF FEMUR 2/>: CPT | Mod: RT

## 2025-08-23 PROCEDURE — 36415 COLL VENOUS BLD VENIPUNCTURE: CPT | Performed by: EMERGENCY MEDICINE

## 2025-08-23 PROCEDURE — 93005 ELECTROCARDIOGRAM TRACING: CPT | Mod: XU

## 2025-08-23 PROCEDURE — 250N000009 HC RX 250: Performed by: EMERGENCY MEDICINE

## 2025-08-23 PROCEDURE — 83735 ASSAY OF MAGNESIUM: CPT | Performed by: EMERGENCY MEDICINE

## 2025-08-23 PROCEDURE — 72125 CT NECK SPINE W/O DYE: CPT

## 2025-08-23 PROCEDURE — 84484 ASSAY OF TROPONIN QUANT: CPT | Performed by: EMERGENCY MEDICINE

## 2025-08-23 PROCEDURE — 90715 TDAP VACCINE 7 YRS/> IM: CPT | Performed by: EMERGENCY MEDICINE

## 2025-08-23 PROCEDURE — 999N000104 CT LUMBAR SPINE RECONSTRUCTED

## 2025-08-23 PROCEDURE — 73030 X-RAY EXAM OF SHOULDER: CPT | Mod: RT

## 2025-08-23 PROCEDURE — 71260 CT THORAX DX C+: CPT

## 2025-08-23 PROCEDURE — 85004 AUTOMATED DIFF WBC COUNT: CPT | Performed by: EMERGENCY MEDICINE

## 2025-08-23 RX ORDER — HYDROMORPHONE HYDROCHLORIDE 1 MG/ML
0.5 INJECTION, SOLUTION INTRAMUSCULAR; INTRAVENOUS; SUBCUTANEOUS ONCE
Refills: 0 | Status: DISCONTINUED | OUTPATIENT
Start: 2025-08-23 | End: 2025-08-24 | Stop reason: HOSPADM

## 2025-08-23 RX ORDER — HYDROMORPHONE HYDROCHLORIDE 1 MG/ML
0.5 INJECTION, SOLUTION INTRAMUSCULAR; INTRAVENOUS; SUBCUTANEOUS ONCE
Refills: 0 | Status: COMPLETED | OUTPATIENT
Start: 2025-08-23 | End: 2025-08-23

## 2025-08-23 RX ADMIN — HYDROMORPHONE HYDROCHLORIDE 0.5 MG: 1 INJECTION, SOLUTION INTRAMUSCULAR; INTRAVENOUS; SUBCUTANEOUS at 21:22

## 2025-08-23 RX ADMIN — CLOSTRIDIUM TETANI TOXOID ANTIGEN (FORMALDEHYDE INACTIVATED), CORYNEBACTERIUM DIPHTHERIAE TOXOID ANTIGEN (FORMALDEHYDE INACTIVATED), BORDETELLA PERTUSSIS TOXOID ANTIGEN (GLUTARALDEHYDE INACTIVATED), BORDETELLA PERTUSSIS FILAMENTOUS HEMAGGLUTININ ANTIGEN (FORMALDEHYDE INACTIVATED), BORDETELLA PERTUSSIS PERTACTIN ANTIGEN, AND BORDETELLA PERTUSSIS FIMBRIAE 2/3 ANTIGEN 0.5 ML: 5; 2; 2.5; 5; 3; 5 INJECTION, SUSPENSION INTRAMUSCULAR at 21:24

## 2025-08-23 RX ADMIN — SODIUM CHLORIDE 62 ML: 9 INJECTION, SOLUTION INTRAVENOUS at 22:02

## 2025-08-23 RX ADMIN — IOHEXOL 89 ML: 350 INJECTION, SOLUTION INTRAVENOUS at 22:02

## 2025-08-23 ASSESSMENT — ACTIVITIES OF DAILY LIVING (ADL)
ADLS_ACUITY_SCORE: 41

## 2025-08-24 VITALS
RESPIRATION RATE: 13 BRPM | DIASTOLIC BLOOD PRESSURE: 89 MMHG | OXYGEN SATURATION: 95 % | SYSTOLIC BLOOD PRESSURE: 122 MMHG | TEMPERATURE: 97.9 F | HEART RATE: 70 BPM

## 2025-08-24 LAB
ATRIAL RATE - MUSE: 78 BPM
DIASTOLIC BLOOD PRESSURE - MUSE: NORMAL MMHG
INTERPRETATION ECG - MUSE: NORMAL
MAGNESIUM SERPL-MCNC: 2 MG/DL (ref 1.7–2.3)
P AXIS - MUSE: 77 DEGREES
PR INTERVAL - MUSE: 246 MS
QRS DURATION - MUSE: 120 MS
QT - MUSE: 434 MS
QTC - MUSE: 494 MS
R AXIS - MUSE: -24 DEGREES
SYSTOLIC BLOOD PRESSURE - MUSE: NORMAL MMHG
T AXIS - MUSE: 154 DEGREES
TROPONIN T SERPL HS-MCNC: 40 NG/L
TROPONIN T SERPL HS-MCNC: 41 NG/L
VENTRICULAR RATE- MUSE: 78 BPM

## 2025-08-24 PROCEDURE — 36415 COLL VENOUS BLD VENIPUNCTURE: CPT | Performed by: STUDENT IN AN ORGANIZED HEALTH CARE EDUCATION/TRAINING PROGRAM

## 2025-08-24 PROCEDURE — 84484 ASSAY OF TROPONIN QUANT: CPT | Performed by: STUDENT IN AN ORGANIZED HEALTH CARE EDUCATION/TRAINING PROGRAM

## 2025-08-24 PROCEDURE — 250N000013 HC RX MED GY IP 250 OP 250 PS 637: Performed by: STUDENT IN AN ORGANIZED HEALTH CARE EDUCATION/TRAINING PROGRAM

## 2025-08-24 RX ORDER — OXYCODONE AND ACETAMINOPHEN 5; 325 MG/1; MG/1
1 TABLET ORAL ONCE
Refills: 0 | Status: COMPLETED | OUTPATIENT
Start: 2025-08-24 | End: 2025-08-24

## 2025-08-24 RX ADMIN — OXYCODONE HYDROCHLORIDE AND ACETAMINOPHEN 1 TABLET: 5; 325 TABLET ORAL at 01:50

## 2025-08-24 ASSESSMENT — ACTIVITIES OF DAILY LIVING (ADL)
ADLS_ACUITY_SCORE: 41

## 2025-08-25 ENCOUNTER — TRANSFERRED RECORDS (OUTPATIENT)
Dept: HEALTH INFORMATION MANAGEMENT | Facility: CLINIC | Age: OVER 89
End: 2025-08-25
Payer: COMMERCIAL

## 2025-09-03 ENCOUNTER — TELEPHONE (OUTPATIENT)
Dept: FAMILY MEDICINE | Facility: CLINIC | Age: OVER 89
End: 2025-09-03

## 2025-09-03 ENCOUNTER — OFFICE VISIT (OUTPATIENT)
Dept: FAMILY MEDICINE | Facility: CLINIC | Age: OVER 89
End: 2025-09-03
Payer: COMMERCIAL

## 2025-09-03 VITALS
RESPIRATION RATE: 16 BRPM | BODY MASS INDEX: 26.36 KG/M2 | OXYGEN SATURATION: 97 % | DIASTOLIC BLOOD PRESSURE: 64 MMHG | HEART RATE: 72 BPM | WEIGHT: 164 LBS | TEMPERATURE: 97.2 F | SYSTOLIC BLOOD PRESSURE: 99 MMHG | HEIGHT: 66 IN

## 2025-09-03 DIAGNOSIS — I25.10 ATHEROSCLEROSIS OF NATIVE CORONARY ARTERY OF NATIVE HEART WITHOUT ANGINA PECTORIS: Primary | ICD-10-CM

## 2025-09-03 DIAGNOSIS — Z23 NEED FOR PROPHYLACTIC VACCINATION AND INOCULATION AGAINST INFLUENZA: ICD-10-CM

## 2025-09-03 DIAGNOSIS — I25.5 ISCHEMIC CARDIOMYOPATHY: ICD-10-CM

## 2025-09-03 DIAGNOSIS — I48.0 PAROXYSMAL ATRIAL FIBRILLATION (H): ICD-10-CM

## 2025-09-03 PROBLEM — R97.20 ELEVATED PROSTATE SPECIFIC ANTIGEN (PSA): Status: RESOLVED | Noted: 2021-04-20 | Resolved: 2025-09-03

## 2025-09-03 PROBLEM — M15.9 GENERALIZED OSTEOARTHROSIS: Status: RESOLVED | Noted: 2021-04-20 | Resolved: 2025-09-03

## 2025-09-03 RX ORDER — METOPROLOL SUCCINATE 25 MG/1
TABLET, EXTENDED RELEASE ORAL
COMMUNITY
Start: 2025-08-25 | End: 2025-09-03

## 2025-09-03 RX ORDER — METOPROLOL SUCCINATE 50 MG/1
TABLET, EXTENDED RELEASE ORAL
COMMUNITY
Start: 2025-07-22

## 2025-09-03 RX ORDER — METOPROLOL SUCCINATE 25 MG/1
25 TABLET, EXTENDED RELEASE ORAL DAILY
Qty: 90 TABLET | Refills: 3 | Status: SHIPPED | OUTPATIENT
Start: 2025-09-03

## 2025-09-03 RX ORDER — CLOPIDOGREL BISULFATE 75 MG/1
TABLET ORAL
COMMUNITY
Start: 2025-08-25 | End: 2025-09-03

## 2025-09-03 RX ORDER — AMIODARONE HYDROCHLORIDE 200 MG/1
200 TABLET ORAL DAILY
COMMUNITY
Start: 2025-08-25

## 2025-09-03 RX ORDER — LISINOPRIL 20 MG/1
20 TABLET ORAL DAILY
Qty: 90 TABLET | Refills: 3 | Status: SHIPPED | OUTPATIENT
Start: 2025-09-03

## 2025-09-03 ASSESSMENT — PAIN SCALES - GENERAL: PAINLEVEL_OUTOF10: NO PAIN (0)
